# Patient Record
Sex: MALE | Race: WHITE | NOT HISPANIC OR LATINO | URBAN - METROPOLITAN AREA
[De-identification: names, ages, dates, MRNs, and addresses within clinical notes are randomized per-mention and may not be internally consistent; named-entity substitution may affect disease eponyms.]

---

## 2017-09-06 ENCOUNTER — OUTPATIENT (OUTPATIENT)
Dept: OUTPATIENT SERVICES | Facility: HOSPITAL | Age: 57
LOS: 1 days | Discharge: HOME | End: 2017-09-06

## 2017-09-06 DIAGNOSIS — Z00.00 ENCOUNTER FOR GENERAL ADULT MEDICAL EXAMINATION WITHOUT ABNORMAL FINDINGS: ICD-10-CM

## 2017-11-08 ENCOUNTER — APPOINTMENT (OUTPATIENT)
Dept: OTOLARYNGOLOGY | Facility: CLINIC | Age: 57
End: 2017-11-08
Payer: COMMERCIAL

## 2017-11-08 DIAGNOSIS — Z87.09 PERSONAL HISTORY OF OTHER DISEASES OF THE RESPIRATORY SYSTEM: ICD-10-CM

## 2017-11-08 DIAGNOSIS — R04.0 EPISTAXIS: ICD-10-CM

## 2017-11-08 DIAGNOSIS — H61.20 IMPACTED CERUMEN, UNSPECIFIED EAR: ICD-10-CM

## 2017-11-08 PROCEDURE — 99214 OFFICE O/P EST MOD 30 MIN: CPT | Mod: 25

## 2017-11-08 PROCEDURE — 31575 DIAGNOSTIC LARYNGOSCOPY: CPT

## 2017-11-08 PROCEDURE — 69210 REMOVE IMPACTED EAR WAX UNI: CPT

## 2017-12-04 ENCOUNTER — APPOINTMENT (OUTPATIENT)
Dept: OTOLARYNGOLOGY | Facility: CLINIC | Age: 57
End: 2017-12-04
Payer: COMMERCIAL

## 2017-12-04 DIAGNOSIS — K14.8 OTHER DISEASES OF TONGUE: ICD-10-CM

## 2017-12-04 DIAGNOSIS — Z78.9 OTHER SPECIFIED HEALTH STATUS: ICD-10-CM

## 2017-12-04 PROCEDURE — 99213 OFFICE O/P EST LOW 20 MIN: CPT

## 2018-03-27 ENCOUNTER — OUTPATIENT (OUTPATIENT)
Dept: OUTPATIENT SERVICES | Facility: HOSPITAL | Age: 58
LOS: 1 days | Discharge: HOME | End: 2018-03-27

## 2018-03-27 DIAGNOSIS — M62.830 MUSCLE SPASM OF BACK: ICD-10-CM

## 2018-09-28 ENCOUNTER — OUTPATIENT (OUTPATIENT)
Dept: OUTPATIENT SERVICES | Facility: HOSPITAL | Age: 58
LOS: 1 days | Discharge: HOME | End: 2018-09-28

## 2018-09-28 DIAGNOSIS — Z00.00 ENCOUNTER FOR GENERAL ADULT MEDICAL EXAMINATION WITHOUT ABNORMAL FINDINGS: ICD-10-CM

## 2018-12-19 ENCOUNTER — OUTPATIENT (OUTPATIENT)
Dept: OUTPATIENT SERVICES | Facility: HOSPITAL | Age: 58
LOS: 1 days | Discharge: HOME | End: 2018-12-19

## 2018-12-19 DIAGNOSIS — M25.531 PAIN IN RIGHT WRIST: ICD-10-CM

## 2019-07-10 ENCOUNTER — APPOINTMENT (OUTPATIENT)
Dept: CARDIOLOGY | Facility: CLINIC | Age: 59
End: 2019-07-10
Payer: COMMERCIAL

## 2019-07-10 PROCEDURE — 99213 OFFICE O/P EST LOW 20 MIN: CPT

## 2019-07-10 PROCEDURE — 93000 ELECTROCARDIOGRAM COMPLETE: CPT

## 2019-09-24 ENCOUNTER — OUTPATIENT (OUTPATIENT)
Dept: OUTPATIENT SERVICES | Facility: HOSPITAL | Age: 59
LOS: 1 days | Discharge: HOME | End: 2019-09-24
Payer: COMMERCIAL

## 2019-09-24 DIAGNOSIS — M54.9 DORSALGIA, UNSPECIFIED: ICD-10-CM

## 2019-09-24 PROCEDURE — 72146 MRI CHEST SPINE W/O DYE: CPT | Mod: 26

## 2019-09-25 ENCOUNTER — APPOINTMENT (OUTPATIENT)
Dept: CARDIOLOGY | Facility: CLINIC | Age: 59
End: 2019-09-25

## 2019-09-26 ENCOUNTER — OUTPATIENT (OUTPATIENT)
Dept: OUTPATIENT SERVICES | Facility: HOSPITAL | Age: 59
LOS: 1 days | Discharge: HOME | End: 2019-09-26

## 2019-09-26 DIAGNOSIS — N39.0 URINARY TRACT INFECTION, SITE NOT SPECIFIED: ICD-10-CM

## 2019-09-26 DIAGNOSIS — Z00.00 ENCOUNTER FOR GENERAL ADULT MEDICAL EXAMINATION WITHOUT ABNORMAL FINDINGS: ICD-10-CM

## 2019-09-26 DIAGNOSIS — E03.9 HYPOTHYROIDISM, UNSPECIFIED: ICD-10-CM

## 2019-09-26 DIAGNOSIS — E78.9 DISORDER OF LIPOPROTEIN METABOLISM, UNSPECIFIED: ICD-10-CM

## 2019-09-26 DIAGNOSIS — D64.9 ANEMIA, UNSPECIFIED: ICD-10-CM

## 2019-09-26 DIAGNOSIS — E11.9 TYPE 2 DIABETES MELLITUS WITHOUT COMPLICATIONS: ICD-10-CM

## 2019-09-26 DIAGNOSIS — N40.0 BENIGN PROSTATIC HYPERPLASIA WITHOUT LOWER URINARY TRACT SYMPTOMS: ICD-10-CM

## 2019-10-01 ENCOUNTER — OUTPATIENT (OUTPATIENT)
Dept: OUTPATIENT SERVICES | Facility: HOSPITAL | Age: 59
LOS: 1 days | Discharge: HOME | End: 2019-10-01
Payer: COMMERCIAL

## 2019-10-01 DIAGNOSIS — Z00.00 ENCOUNTER FOR GENERAL ADULT MEDICAL EXAMINATION WITHOUT ABNORMAL FINDINGS: ICD-10-CM

## 2019-10-01 PROCEDURE — 71046 X-RAY EXAM CHEST 2 VIEWS: CPT | Mod: 26

## 2019-10-01 PROCEDURE — 76775 US EXAM ABDO BACK WALL LIM: CPT | Mod: 26

## 2019-12-09 ENCOUNTER — INPATIENT (INPATIENT)
Facility: HOSPITAL | Age: 59
LOS: 0 days | Discharge: HOME | End: 2019-12-10
Attending: INTERNAL MEDICINE | Admitting: INTERNAL MEDICINE
Payer: COMMERCIAL

## 2019-12-09 ENCOUNTER — APPOINTMENT (OUTPATIENT)
Dept: CARDIOLOGY | Facility: CLINIC | Age: 59
End: 2019-12-09
Payer: COMMERCIAL

## 2019-12-09 VITALS
SYSTOLIC BLOOD PRESSURE: 143 MMHG | TEMPERATURE: 99 F | OXYGEN SATURATION: 96 % | DIASTOLIC BLOOD PRESSURE: 71 MMHG | RESPIRATION RATE: 18 BRPM | HEART RATE: 65 BPM

## 2019-12-09 DIAGNOSIS — Z98.890 OTHER SPECIFIED POSTPROCEDURAL STATES: Chronic | ICD-10-CM

## 2019-12-09 LAB
ALBUMIN SERPL ELPH-MCNC: 4.2 G/DL — SIGNIFICANT CHANGE UP (ref 3.5–5.2)
ALP SERPL-CCNC: 57 U/L — SIGNIFICANT CHANGE UP (ref 30–115)
ALT FLD-CCNC: 22 U/L — SIGNIFICANT CHANGE UP (ref 0–41)
ANION GAP SERPL CALC-SCNC: 16 MMOL/L — HIGH (ref 7–14)
AST SERPL-CCNC: 23 U/L — SIGNIFICANT CHANGE UP (ref 0–41)
BASOPHILS # BLD AUTO: 0.02 K/UL — SIGNIFICANT CHANGE UP (ref 0–0.2)
BASOPHILS NFR BLD AUTO: 0.3 % — SIGNIFICANT CHANGE UP (ref 0–1)
BILIRUB SERPL-MCNC: 0.9 MG/DL — SIGNIFICANT CHANGE UP (ref 0.2–1.2)
BUN SERPL-MCNC: 16 MG/DL — SIGNIFICANT CHANGE UP (ref 10–20)
CALCIUM SERPL-MCNC: 9.2 MG/DL — SIGNIFICANT CHANGE UP (ref 8.5–10.1)
CHLORIDE SERPL-SCNC: 101 MMOL/L — SIGNIFICANT CHANGE UP (ref 98–110)
CO2 SERPL-SCNC: 21 MMOL/L — SIGNIFICANT CHANGE UP (ref 17–32)
CREAT SERPL-MCNC: 0.9 MG/DL — SIGNIFICANT CHANGE UP (ref 0.7–1.5)
EOSINOPHIL # BLD AUTO: 0.14 K/UL — SIGNIFICANT CHANGE UP (ref 0–0.7)
EOSINOPHIL NFR BLD AUTO: 1.9 % — SIGNIFICANT CHANGE UP (ref 0–8)
GLUCOSE SERPL-MCNC: 101 MG/DL — HIGH (ref 70–99)
HCT VFR BLD CALC: 43.8 % — SIGNIFICANT CHANGE UP (ref 42–52)
HCT VFR BLD CALC: 44.8 % — SIGNIFICANT CHANGE UP (ref 42–52)
HGB BLD-MCNC: 14.7 G/DL — SIGNIFICANT CHANGE UP (ref 14–18)
HGB BLD-MCNC: 15.5 G/DL — SIGNIFICANT CHANGE UP (ref 14–18)
IMM GRANULOCYTES NFR BLD AUTO: 0.3 % — SIGNIFICANT CHANGE UP (ref 0.1–0.3)
LYMPHOCYTES # BLD AUTO: 1.12 K/UL — LOW (ref 1.2–3.4)
LYMPHOCYTES # BLD AUTO: 15 % — LOW (ref 20.5–51.1)
MAGNESIUM SERPL-MCNC: 2.2 MG/DL — SIGNIFICANT CHANGE UP (ref 1.8–2.4)
MCHC RBC-ENTMCNC: 29.4 PG — SIGNIFICANT CHANGE UP (ref 27–31)
MCHC RBC-ENTMCNC: 29.9 PG — SIGNIFICANT CHANGE UP (ref 27–31)
MCHC RBC-ENTMCNC: 33.6 G/DL — SIGNIFICANT CHANGE UP (ref 32–37)
MCHC RBC-ENTMCNC: 34.6 G/DL — SIGNIFICANT CHANGE UP (ref 32–37)
MCV RBC AUTO: 86.3 FL — SIGNIFICANT CHANGE UP (ref 80–94)
MCV RBC AUTO: 87.6 FL — SIGNIFICANT CHANGE UP (ref 80–94)
MONOCYTES # BLD AUTO: 0.95 K/UL — HIGH (ref 0.1–0.6)
MONOCYTES NFR BLD AUTO: 12.8 % — HIGH (ref 1.7–9.3)
NEUTROPHILS # BLD AUTO: 5.2 K/UL — SIGNIFICANT CHANGE UP (ref 1.4–6.5)
NEUTROPHILS NFR BLD AUTO: 69.7 % — SIGNIFICANT CHANGE UP (ref 42.2–75.2)
NRBC # BLD: 0 /100 WBCS — SIGNIFICANT CHANGE UP (ref 0–0)
NRBC # BLD: 0 /100 WBCS — SIGNIFICANT CHANGE UP (ref 0–0)
PLATELET # BLD AUTO: 203 K/UL — SIGNIFICANT CHANGE UP (ref 130–400)
PLATELET # BLD AUTO: 219 K/UL — SIGNIFICANT CHANGE UP (ref 130–400)
POTASSIUM SERPL-MCNC: 4.2 MMOL/L — SIGNIFICANT CHANGE UP (ref 3.5–5)
POTASSIUM SERPL-SCNC: 4.2 MMOL/L — SIGNIFICANT CHANGE UP (ref 3.5–5)
PROT SERPL-MCNC: 6.9 G/DL — SIGNIFICANT CHANGE UP (ref 6–8)
RBC # BLD: 5 M/UL — SIGNIFICANT CHANGE UP (ref 4.7–6.1)
RBC # BLD: 5.19 M/UL — SIGNIFICANT CHANGE UP (ref 4.7–6.1)
RBC # FLD: 12.1 % — SIGNIFICANT CHANGE UP (ref 11.5–14.5)
RBC # FLD: 12.3 % — SIGNIFICANT CHANGE UP (ref 11.5–14.5)
SODIUM SERPL-SCNC: 138 MMOL/L — SIGNIFICANT CHANGE UP (ref 135–146)
TROPONIN T SERPL-MCNC: 0.05 NG/ML — CRITICAL HIGH
TROPONIN T SERPL-MCNC: 0.05 NG/ML — CRITICAL HIGH
WBC # BLD: 4.75 K/UL — LOW (ref 4.8–10.8)
WBC # BLD: 7.45 K/UL — SIGNIFICANT CHANGE UP (ref 4.8–10.8)
WBC # FLD AUTO: 4.75 K/UL — LOW (ref 4.8–10.8)
WBC # FLD AUTO: 7.45 K/UL — SIGNIFICANT CHANGE UP (ref 4.8–10.8)

## 2019-12-09 PROCEDURE — 93458 L HRT ARTERY/VENTRICLE ANGIO: CPT | Mod: 26,XU

## 2019-12-09 PROCEDURE — 93010 ELECTROCARDIOGRAM REPORT: CPT | Mod: 59

## 2019-12-09 PROCEDURE — 92928 PRQ TCAT PLMT NTRAC ST 1 LES: CPT | Mod: RC

## 2019-12-09 PROCEDURE — 99285 EMERGENCY DEPT VISIT HI MDM: CPT

## 2019-12-09 PROCEDURE — 71046 X-RAY EXAM CHEST 2 VIEWS: CPT | Mod: 26

## 2019-12-09 PROCEDURE — 93000 ELECTROCARDIOGRAM COMPLETE: CPT | Mod: 77

## 2019-12-09 PROCEDURE — 93010 ELECTROCARDIOGRAM REPORT: CPT | Mod: 77

## 2019-12-09 PROCEDURE — 99214 OFFICE O/P EST MOD 30 MIN: CPT

## 2019-12-09 PROCEDURE — 99223 1ST HOSP IP/OBS HIGH 75: CPT

## 2019-12-09 RX ORDER — TRIHEXYPHENIDYL HCL 2 MG
1 TABLET ORAL DAILY
Refills: 0 | Status: DISCONTINUED | OUTPATIENT
Start: 2019-12-09 | End: 2019-12-10

## 2019-12-09 RX ORDER — ASPIRIN/CALCIUM CARB/MAGNESIUM 324 MG
81 TABLET ORAL DAILY
Refills: 0 | Status: DISCONTINUED | OUTPATIENT
Start: 2019-12-09 | End: 2019-12-10

## 2019-12-09 RX ORDER — CLOPIDOGREL BISULFATE 75 MG/1
75 TABLET, FILM COATED ORAL DAILY
Refills: 0 | Status: DISCONTINUED | OUTPATIENT
Start: 2019-12-10 | End: 2019-12-10

## 2019-12-09 RX ORDER — CLONAZEPAM 1 MG
0.5 TABLET ORAL
Refills: 0 | Status: DISCONTINUED | OUTPATIENT
Start: 2019-12-09 | End: 2019-12-10

## 2019-12-09 RX ORDER — CLONAZEPAM 1 MG
0.5 TABLET ORAL
Qty: 0 | Refills: 0 | DISCHARGE

## 2019-12-09 RX ORDER — SODIUM CHLORIDE 9 MG/ML
1000 INJECTION INTRAMUSCULAR; INTRAVENOUS; SUBCUTANEOUS
Refills: 0 | Status: DISCONTINUED | OUTPATIENT
Start: 2019-12-09 | End: 2019-12-10

## 2019-12-09 RX ORDER — ENOXAPARIN SODIUM 100 MG/ML
40 INJECTION SUBCUTANEOUS AT BEDTIME
Refills: 0 | Status: DISCONTINUED | OUTPATIENT
Start: 2019-12-09 | End: 2019-12-10

## 2019-12-09 RX ORDER — TRIHEXYPHENIDYL HCL 2 MG
1 TABLET ORAL
Qty: 0 | Refills: 0 | DISCHARGE

## 2019-12-09 RX ORDER — ATORVASTATIN CALCIUM 80 MG/1
80 TABLET, FILM COATED ORAL AT BEDTIME
Refills: 0 | Status: DISCONTINUED | OUTPATIENT
Start: 2019-12-09 | End: 2019-12-10

## 2019-12-09 RX ORDER — CHLORHEXIDINE GLUCONATE 213 G/1000ML
1 SOLUTION TOPICAL
Refills: 0 | Status: DISCONTINUED | OUTPATIENT
Start: 2019-12-09 | End: 2019-12-10

## 2019-12-09 RX ORDER — AMLODIPINE BESYLATE 2.5 MG/1
0 TABLET ORAL
Qty: 0 | Refills: 0 | DISCHARGE

## 2019-12-09 RX ORDER — AMLODIPINE BESYLATE 2.5 MG/1
5 TABLET ORAL DAILY
Refills: 0 | Status: DISCONTINUED | OUTPATIENT
Start: 2019-12-09 | End: 2019-12-10

## 2019-12-09 RX ORDER — ASPIRIN/CALCIUM CARB/MAGNESIUM 324 MG
325 TABLET ORAL ONCE
Refills: 0 | Status: COMPLETED | OUTPATIENT
Start: 2019-12-09 | End: 2019-12-09

## 2019-12-09 RX ADMIN — ATORVASTATIN CALCIUM 80 MILLIGRAM(S): 80 TABLET, FILM COATED ORAL at 21:57

## 2019-12-09 RX ADMIN — Medication 0.5 MILLIGRAM(S): at 21:57

## 2019-12-09 RX ADMIN — ENOXAPARIN SODIUM 40 MILLIGRAM(S): 100 INJECTION SUBCUTANEOUS at 21:57

## 2019-12-09 NOTE — H&P ADULT - ATTENDING COMMENTS
HPI as above.  Interval history: Pt seen and examined at bedside. No cp or sob.   Vital Signs (24 Hrs):  T(C): 37.2 (12-09-19 @ 12:06), Max: 37.2 (12-09-19 @ 12:06)  HR: 65 (12-09-19 @ 12:06) (65 - 65)  BP: 143/71 (12-09-19 @ 12:06) (143/71 - 143/71)  RR: 18 (12-09-19 @ 12:06) (18 - 18)  SpO2: 96% (12-09-19 @ 12:06) (96% - 96%)  Wt(kg): --  Daily     Daily     I&O's Summary    PHYSICAL EXAM:  GENERAL: NAD, well-developed  HEAD:  Atraumatic, Normocephalic  EYES: EOMI, PERRLA, conjunctiva and sclera clear  NECK: Supple, No JVD  CHEST/LUNG: Clear to auscultation bilaterally; No wheeze  HEART: Regular rate and rhythm; No murmurs, rubs, or gallops  ABDOMEN: Soft, Nontender, Nondistended; Bowel sounds present  EXTREMITIES:  2+ Peripheral Pulses, No clubbing, cyanosis, or edema  PSYCH: AAOx3  NEUROLOGY: non-focal  SKIN: No rashes or lesions    Labs reviewed  Imaging reviewed: < from: Xray Chest 2 Views PA/Lat (12.09.19 @ 13:18) >    Impression:      No radiographic evidence of acute cardiopulmonary disease.    < end of copied text >      EKG reviewed: < from: 12 Lead ECG (12.09.19 @ 12:08) >    Diagnosis Line Normal sinus rhythm  Rightward axis  Borderline ECG    Plan  #Unstable angina s/p Cath  -RADHA x1 pRCA   -Continue asa, plavix, no Beta-Blocker therapy (pt HR 50-60s)  -Restart Acei in am   -Pt on lipitor tonight, pt has myalgias on lipitor, DW cardiology recc crestor 20 mg Po daily upon DC  -Admit to cardiac telemonitoring  -follow up cardiology outpt   -Echo in am    #depression- continue celebrex     #HTN- continue amlodipine, acei in am.     #Progress Note Handoff  Pending (specify):  Consults_________, Tests________, Test Results_______, Other_________  Family discussion:  Disposition: Home___/SNF___/Other________/Unknown at this time________ HPI as above.  Interval history: Pt seen and examined at bedside. No cp or sob.   Vital Signs (24 Hrs):  T(C): 37.2 (12-09-19 @ 12:06), Max: 37.2 (12-09-19 @ 12:06)  HR: 65 (12-09-19 @ 12:06) (65 - 65)  BP: 143/71 (12-09-19 @ 12:06) (143/71 - 143/71)  RR: 18 (12-09-19 @ 12:06) (18 - 18)  SpO2: 96% (12-09-19 @ 12:06) (96% - 96%)  Wt(kg): --  Daily     Daily     I&O's Summary    PHYSICAL EXAM:  GENERAL: NAD, well-developed  HEAD:  Atraumatic, Normocephalic  EYES: EOMI, PERRLA, conjunctiva and sclera clear  NECK: Supple, No JVD  CHEST/LUNG: Clear to auscultation bilaterally; No wheeze  HEART: Regular rate and rhythm; No murmurs, rubs, or gallops  ABDOMEN: Soft, Nontender, Nondistended; Bowel sounds present  EXTREMITIES:  2+ Peripheral Pulses, No clubbing, cyanosis, or edema  PSYCH: AAOx3  NEUROLOGY: non-focal  SKIN: No rashes or lesions    Labs reviewed  Imaging reviewed: < from: Xray Chest 2 Views PA/Lat (12.09.19 @ 13:18) >    Impression:      No radiographic evidence of acute cardiopulmonary disease.    < end of copied text >      EKG reviewed: < from: 12 Lead ECG (12.09.19 @ 12:08) >    Diagnosis Line Normal sinus rhythm  Rightward axis  Borderline ECG    Plan  #Unstable angina s/p Cath  -RADHA x1 pRCA   -Continue asa, plavix, no Beta-Blocker therapy (pt HR 50-60s)  -Restart Acei in am   -Pt on lipitor tonight, pt has myalgias on lipitor, DW cardiology recc crestor 20 mg Po daily upon DC  -Admit to cardiac telemonitoring  -follow up cardiology outpt   -Echo in am    #depression- continue celebrex     #HTN- continue amlodipine, acei in am.     #Progress Note Handoff  Pending (specify):  Consults___Cardio______, Tests________, Test Results_______, Other_________  Family discussion: debbi pt and agreed to plan to be admitted to cardiac telemonitoring for overnight monitoring psot cath  Disposition: Home_x__/SNF___/Other________/Unknown at this time________ HPI as above.  Interval history: Pt seen and examined at bedside. No cp or sob.   Vital Signs (24 Hrs):  T(C): 37.2 (12-09-19 @ 12:06), Max: 37.2 (12-09-19 @ 12:06)  HR: 65 (12-09-19 @ 12:06) (65 - 65)  BP: 143/71 (12-09-19 @ 12:06) (143/71 - 143/71)  RR: 18 (12-09-19 @ 12:06) (18 - 18)  SpO2: 96% (12-09-19 @ 12:06) (96% - 96%)  Wt(kg): --  Daily     Daily     I&O's Summary    PHYSICAL EXAM:  GENERAL: NAD, well-developed  HEAD:  Atraumatic, Normocephalic  EYES: EOMI, PERRLA, conjunctiva and sclera clear  NECK: Supple, No JVD  CHEST/LUNG: Clear to auscultation bilaterally; No wheeze  HEART: Regular rate and rhythm; No murmurs, rubs, or gallops  ABDOMEN: Soft, Nontender, Nondistended; Bowel sounds present  EXTREMITIES:  2+ Peripheral Pulses, No clubbing, cyanosis, or edema  PSYCH: AAOx3  NEUROLOGY: non-focal  SKIN: No rashes or lesions    Labs reviewed  Imaging reviewed: < from: Xray Chest 2 Views PA/Lat (12.09.19 @ 13:18) >    Impression:      No radiographic evidence of acute cardiopulmonary disease.    < end of copied text >      EKG reviewed: < from: 12 Lead ECG (12.09.19 @ 12:08) >    Diagnosis Line Normal sinus rhythm  Rightward axis  Borderline ECG    Plan  #Unstable angina s/p Cath  -RADHA x1 pRCA   -Continue asa, plavix, no Beta-Blocker therapy (pt HR 50-60s)  -Restart Acei in am   -Pt on lipitor tonight, pt has myalgias on lipitor, DW cardiology recc crestor 20 mg Po daily upon DC  -Admit to cardiac telemonitoring  -follow up cardiology outpt   -Echo in am    #chronic back pain-dc celebrex     #HTN- continue amlodipine, acei in am.     #Progress Note Handoff  Pending (specify):  Consults___Cardio______, Tests________, Test Results_______, Other_________  Family discussion: dw pt and agreed to plan to be admitted to cardiac telemonitoring for overnight monitoring psot cath  Disposition: Home_x__/SNF___/Other________/Unknown at this time________

## 2019-12-09 NOTE — CONSULT NOTE ADULT - SUBJECTIVE AND OBJECTIVE BOX
Date of Admission: 12/9/2019    CHIEF COMPLAINT: chest pain    HISTORY OF PRESENT ILLNESS: 59yMale with PMH below presented to the hospital for one week of on and off chest pain, sometimes with exertion, sometimes at rest with radiation to the back of the neck and sometimes into bilateral upper extremities. He went to Dr. Solis's office this morning and symptoms and EKG were concerning for acute coronary syndrome, which prompted a visit to the emergency room. At present is pain free. Of note, had a negative stress echo three years ago and has a history of mild prolapse of the anterior leaflet of the mitral valve. Strong family history of cardiovascular disease.     PAST MEDICAL & SURGICAL HISTORY:  Gastric ulcer  HTN (hypertension)  H/O hernia repair      FAMILY HISTORY:  [ ] no pertinent family history of premature cardiovascular disease in first degree relatives.  Mother:   Father: sudden cardiac death in his 50s  Siblings:     SOCIAL HISTORY:    [x ] Non-smoker  [ ] Smoker  [ ] Alcohol    Allergies    penicillin (Unknown)  sulfa drugs (Unknown)    Intolerances    	    REVIEW OF SYSTEMS:  CONSTITUTIONAL: No fever, weight loss, or fatigue  CARDIOLOGY: see HPI  RESPIRATORY: denies shortness of breath, wheezeing.   NEUROLOGICAL: NO weakness, no focal deficits to report.  ENDOCRINOLOGICAL: no recent change in diabetic medications.   GI: no BRBPR, no N,V,diarrhea.    PSYCHIATRY: normal mood and affect  HEENT: no nasal discharge, no ecchymosis  SKIN: no ecchymosis, no breakdown  MUSCULOSKELETAL: Full range of motion x4.     PHYSICAL EXAM:  T(C): 37.2 (12-09-19 @ 12:06), Max: 37.2 (12-09-19 @ 12:06)  HR: 65 (12-09-19 @ 12:06) (65 - 65)  BP: 143/71 (12-09-19 @ 12:06) (143/71 - 143/71)  RR: 18 (12-09-19 @ 12:06) (18 - 18)  SpO2: 96% (12-09-19 @ 12:06) (96% - 96%)  Wt(kg): --  I&O's Summary      General Appearance: well appearing, normal for age and gender. 	  Neck: normal JVP, no bruit.   Eyes: No xanthomalasia, Extra Ocular muscles intact.   Cardiovascular: regular rate and rhythm S1 S2, No JVD, No murmurs, No edema  Respiratory: Lungs clear to auscultation	  Psychiatry: Alert and oriented x 3, Mood & affect appropriate  Gastrointestinal:  Soft, Non-tender  Skin/Integumen: No rashes, No ecchymoses, No cyanosis	  Neurologic: Non-focal  Musculoskeletal/ extremities: Normal range of motion, No clubbing, cyanosis or edema  Vascular: Peripheral pulses palpable 2+ bilaterally    LABS:	 	                          15.5   4.75  )-----------( 203      ( 09 Dec 2019 12:50 )             44.8     12-09    138  |  101  |  16  ----------------------------<  101<H>  4.2   |  21  |  0.9    Ca    9.2      09 Dec 2019 12:50  Mg     2.2     12-09    TPro  6.9  /  Alb  4.2  /  TBili  0.9  /  DBili  x   /  AST  23  /  ALT  22  /  AlkPhos  57  12-09    CARDIAC MARKERS ( 09 Dec 2019 12:50 )  x     / 0.05 ng/mL / x     / x     / x              CARDIAC MARKERS:            TELEMETRY EVENTS: 	    ECG:  	NSR with TWI in inferior leads  RADIOLOGY:  OTHER: 	    PREVIOUS DIAGNOSTIC TESTING:    [ ] Echocardiogram:  [ ]  Catheterization:  [ ] Stress Test:  	2016 stress echo, WNL  	    Home Medications:  amLODIPine:  (09 Dec 2019 13:37)    MEDICATIONS  (STANDING):    MEDICATIONS  (PRN):

## 2019-12-09 NOTE — ED ADULT NURSE NOTE - NSIMPLEMENTINTERV_GEN_ALL_ED
Implemented All Universal Safety Interventions:  [place] to call system. Call bell, personal items and telephone within reach. Instruct patient to call for assistance. Room bathroom lighting operational. Non-slip footwear when patient is off stretcher. Physically safe environment: no spills, clutter or unnecessary equipment. Stretcher in lowest position, wheels locked, appropriate side rails in place.

## 2019-12-09 NOTE — ED PROVIDER NOTE - NS ED ROS FT
Constitutional:  See HPI.  Eyes:  No visual changes, eye pain or discharge.  ENMT:  No hearing changes, pain, discharge or infections. No neck pain or stiffness.  Cardiac: see hpi  Respiratory:  No cough or respiratory distress. No hemoptysis. No history of asthma or RAD.  GI:  No nausea, vomiting, diarrhea or abdominal pain.  :  No dysuria, frequency or burning.  MS:  No myalgia, muscle weakness, joint pain or back pain.  Neuro:  No headache or weakness.  No LOC.  Skin:  No skin rash.   Endocrine: No history of thyroid disease or diabetes.  Except as documented in the HPI,  all other systems are negative.

## 2019-12-09 NOTE — ED ADULT NURSE NOTE - IN THE PAST 12 MONTHS HAVE YOU USED DRUGS OTHER THAN THOSE REQUIRED FOR MEDICAL REASON?
Problem: Fall Injury Risk  Goal: Absence of Fall and Fall-Related Injury    Intervention: Identify and Manage Contributors to Fall Injury Risk   01/02/19 2000   Manage Acute Allergic Reaction   Medication Review/Management medications reviewed   Identify and Manage Contributors to Fall Injury Risk   Self-Care Promotion independence encouraged;BADL personal objects within reach;BADL personal routines maintained     Intervention: Promote Injury-Free Environment   01/02/19 2000 01/03/19 1400   Optimize [place] and Functional Mobility   Environmental Safety Modification clutter free environment maintained;lighting adjusted;room near unit station --    Optimize Balance and Safe Activity   Safety Promotion/Fall Prevention --  bed alarm set;Fall Risk reviewed with patient/family;instructed to call staff for mobility            No

## 2019-12-09 NOTE — H&P ADULT - NSHPSOCIALHISTORY_GEN_ALL_CORE
Patient denies smoking history; endorses drinking 1 beer/week; denies illicit drug use including cocaine, amphetamines, marijuana, opiates. Lives at home w/ wife; fully functional.

## 2019-12-09 NOTE — ED PROVIDER NOTE - CLINICAL SUMMARY MEDICAL DECISION MAKING FREE TEXT BOX
60 Y/O H HTN, C/OP CP X 5 DAYS. + EKG CHANGES. TROPONIN NORMAL. CXR NORMAL. CARDIOLOGY CONSULTED, FOR CATH LATER TODAY. PT ADMITTED TO TELEMETRY.

## 2019-12-09 NOTE — ED ADULT NURSE NOTE - PRIVACY CONCERNS
Discharge phone call completed with patient on 2/14/2019 @ 1130.      Questions and concerns were verbalized:  No issues reported at this time.    Patient is aware of signs and symptoms necessitating a call to his  doctor. Patient did not receive any prescriptions during this ED visit.     Patient confirmed that he is aware of scheduling a follow-up appointment.     Lazarus Gutiérrez RN  2/14/2019     No

## 2019-12-09 NOTE — H&P ADULT - ASSESSMENT
The patient is a 59y male with a PMHx of mild mitral valve prolapse, +FH CAD, gastric ulcer, HTN, never smoker, chronic back pain, focal dystonia presented to [facility] from Cardiologist Dr. Solis's office after 5 days of intermittent chest pain, admitted for unstable angina, s/p cardiac cath today 12/9 s/p RADHA in RCA.    # Unstable angina s/p cardiac cath 12/9 s/p RADHA in RCA, resolved  Begin Lipitor 80 mg PO qHS tonight  C/w amlodipine 5 mg PO qHS (can switch to Crestor tomorrow)  C/w Plavix 75 mg PO qD, aspirin 81 mg PO qD  F/u 8 PM, AM trops  F/u 2D echo  F/u w/ Cardio  Admit to Telemetry    # HTN  C/w amlodipine 5 mg PO qHS (can switch to Crestor tomorrow)  Vitals per routine    # Chronic back pain  F/u w/ PMD; Celebrex d/c'd due to bleeding risk in conjunction w/ other medications    # Anxiety  C/w Klonopin 0.5 mg PO BID    # CHG  # Dispo- acute; pending echo; admit to tele for monitoring s/p cardiac cath  # DVT ppx- Lovenox 40 mg PO qHS  # GI ppx- not indicated  # Code status  # The patient is a 59y male with a PMHx of mild mitral valve prolapse, +FH CAD, gastric ulcer, HTN, never smoker, chronic back pain, focal dystonia presented to [facility] from Cardiologist Dr. Solis's office after 5 days of intermittent chest pain, admitted for unstable angina, s/p cardiac cath today 12/9 s/p RADHA in RCA.    # Unstable angina s/p cardiac cath 12/9 s/p RADHA in RCA, resolved  Begin Lipitor 80 mg PO qHS tonight  C/w amlodipine 5 mg PO qHS (can switch to Crestor tomorrow)  C/w Plavix 75 mg PO qD, aspirin 81 mg PO qD  F/u 8 PM, AM trops  F/u 2D echo  F/u w/ Cardio  Admit to Telemetry    # HTN  C/w amlodipine 5 mg PO qHS (can switch to Crestor tomorrow)  Vitals per routine    # Chronic back pain  F/u w/ PMD; Celebrex d/c'd due to bleeding risk in conjunction w/ other medications    # Anxiety  C/w Klonopin 0.5 mg PO BID    # CHG  # Dispo- acute; pending echo; admit to tele for monitoring s/p cardiac cath  # DVT ppx- Lovenox 40 mg PO qHS  # GI ppx- not indicated  # Code status  # Activity- bedrest  # Diet- DASH

## 2019-12-09 NOTE — H&P ADULT - NSHPLABSRESULTS_GEN_ALL_CORE
15.5   4.75  )-----------( 203      ( 09 Dec 2019 12:50 )             44.8     12-09    138  |  101  |  16  ----------------------------<  101<H>  4.2   |  21  |  0.9    Ca    9.2      09 Dec 2019 12:50  Mg     2.2     12-09    TPro  6.9  /  Alb  4.2  /  TBili  0.9  /  DBili  x   /  AST  23  /  ALT  22  /  AlkPhos  57  12-09    Troponin T, Serum (12.09.19 @ 12:50)    Troponin T, Serum: 0.05: Critical value:  TYPE:(C=Critical, N=Notification, A=Abnormal) C  TESTS: _TROP  DATE/TIME CALLED: _12/09/19 13:30  CALLED TO: _DR.VAN PARKINSON  READ BACK (2 Patient Identifiers)(Y/N): _Y  READ BACK VALUES (Y/N): _Y  CALLED BY: _BMR ng/mL    < from: 12 Lead ECG (12.09.19 @ 12:08) >      Ventricular Rate 67 BPM    Atrial Rate 67 BPM    P-R Interval 150 ms    QRS Duration 90 ms    Q-T Interval 394 ms    QTC Calculation(Bezet) 416 ms    P Axis 49 degrees    R Axis 102 degrees    T Axis 2 degrees    Diagnosis Line Normal sinus rhythm  Rightward axis  Borderline ECG    Confirmed by VINCENT AUSTIN MD ([id number]) on 12/9/2019 4:16:51 PM

## 2019-12-09 NOTE — CHART NOTE - NSCHARTNOTEFT_GEN_A_CORE
PRE-OP DIAGNOSIS: unstable angina    PROCEDURE: [facility] with coronary angiography    Physician: Ziggy  Assistant: Don    ANESTHESIA TYPE:  [  ]General Anesthesia  [ x ] Sedation  [ x ] Local/Regional    ESTIMATED BLOOD LOSS:    10   mL    CONDITION  [  ] Critical  [  ] Serious  [  ]Fair  [x  ]Good      SPECIMENS REMOVED (IF APPLICABLE): N/A      IV CONTRAST:      150       mL      IMPLANTS (IF APPLICABLE)      FINDINGS    Left Heart Catheterization:  LVEF%:  LVEDP: normal  [ ] Normal Coronary Arteries  [ ] Luminal Irregularities  [ ] Non-obstructive CAD      LEFT HEART CATHETERIZATION                                    Left main: normal    LAD: prox mild disease  Diag:    Left Circumflex: minor luminal irregularities  OM:    Right Coronary Artery: prox 99% lesion.   RPDA  RPL    Ramus Intermed:    DOMINANCE: Right    ACCESS: r radial  CLOSURE: d stat    INTERVENTION  IMPLANTS: RADHA x1 in pRCA    POST-OP DIAGNOSIS: unstable angina, PCI to RCA          PLAN OF CARE  [ ] D/C Home today  [ ]  D/C in AM  [x ] Return to In-patient bed  [ ] Admit for observation  [ ] Return for staged procedure:  [ ] CT Surgery consult called  [x ]  Continue DAPT, B-blocker & Statin therapy PRE-OP DIAGNOSIS: unstable angina    PROCEDURE: [facility] with coronary angiography    Physician: Ziggy  Assistant: Don    ANESTHESIA TYPE:  [  ]General Anesthesia  [ x ] Sedation  [ x ] Local/Regional    ESTIMATED BLOOD LOSS:    10   mL    CONDITION  [  ] Critical  [  ] Serious  [  ]Fair  [x  ]Good      SPECIMENS REMOVED (IF APPLICABLE): N/A      IV CONTRAST:      150       mL      IMPLANTS (IF APPLICABLE)      FINDINGS    Left Heart Catheterization:  LVEF%:  LVEDP: normal  [ ] Normal Coronary Arteries  [ ] Luminal Irregularities  [ ] Non-obstructive CAD      LEFT HEART CATHETERIZATION                                    Left main: normal    LAD: prox mild disease  Diag:    Left Circumflex: minor luminal irregularities  OM:    Right Coronary Artery: prox 95% lesion.   RPDA  RPL    Ramus Intermed:    DOMINANCE: Right    ACCESS: r radial  CLOSURE: d stat    INTERVENTION  IMPLANTS: RADHA x1 in pRCA    POST-OP DIAGNOSIS: unstable angina, PCI to RCA          PLAN OF CARE  [ ] D/C Home today  [ ]  D/C in AM  [x ] Return to In-patient bed  [ ] Admit for observation  [ ] Return for staged procedure:  [ ] CT Surgery consult called  [x ]  Continue DAPT, B-blocker & Statin therapy

## 2019-12-09 NOTE — H&P ADULT - HISTORY OF PRESENT ILLNESS
59y Male with a PMHx  mild mitral valve prolapse, +FH CAD, gastric ulcer, HTN, never smoker, chronic back pain, focal dystonia presented to [facility] from Cardiologist Dr. burroughs office after 5 days of intermittent chest pain. He says that cp radiated to his neck and located in mid chest. He described it as a pressure pain.   EKG was  concerning for acute coronary syndrome, which prompted a visit to the emergency room. Pt now s/p cath. Denies any cp, sob, abd pan, n/v 59y Male with a PMHx  mild mitral valve prolapse, +FH CAD, gastric ulcer, HTN, never smoker, chronic back pain, focal dystonia presented to [facility] from Cardiologist Dr. burroughs office after 5 days of intermittent chest pain. He says that cp radiated to his neck and located in mid chest. He described it as a pressure pain which started to increase in frequency last Tuesday/Wednesday. As he was walking from the lot to the office, the pressure was worsening, so he presented to Dr. Burroughs. EKG was  concerning for acute coronary syndrome, which prompted a visit to the emergency room. Pt now s/p cath. Denies any cp, sob, abd mckeon, n/v.

## 2019-12-09 NOTE — H&P ADULT - NSHPPHYSICALEXAM_GEN_ALL_CORE
Constitutional: AAOx3, NAD  HEENT: atraumatic, normocephalic  Cardiovascular: NS1, S2, RRR, no murmurs, rubs, gallops  Pulmonary: clear to auscultation b/l; non-tachypneic, non-cyanotic  Gastrointestinal: soft, nontender, nondistended; + bowel sounds in all quadrants  Extremities: slight erythema at site of cath insertion on right forearm; otherwise unremarkable  Neurological: non-focal

## 2019-12-09 NOTE — ED PROVIDER NOTE - DIAGNOSTIC INTERPRETATION
ER Physician: RIO Ferraro M.D.  CHEST XRAY INTERPRETATION: lungs clear, heart shadow normal, bony structures intact

## 2019-12-09 NOTE — ED PROVIDER NOTE - OBJECTIVE STATEMENT
58 Y/O M HTN, DYSTONIA (ON KLONOPIN), NONSMOKER C/O 4-5 DAYS OF CP. CP IS L SIDED AND DESCRIBED AS "DULL PAIN." PAIN WAS INITIALLY EXERTIONAL AND 3 DAYS AGO BEGAN TO OCCUR AT REST. CP RADIATES TO B/L SHOULDERS AND ARMS. + ASSOCIATED DIZZINESS. NO SOB, DIAPHORESIS, NAUSEA. FHX SUDDEN DEATH AT AGE 55 Y/O. NO ABD PAIN, BACK PAIN. NO LEG PAIN OR EDEMA. PT SEEN IN THE OFFICE BY DR. CORTES TODAY AND REFERERD TO THE ED FOR CARDIAC CATH THIS AFTERNOON.

## 2019-12-09 NOTE — CONSULT NOTE ADULT - ASSESSMENT
UNstable Angina  - Aspirin 325  - 2d echo  - serial cardiac enzymes and ekgs  - monitor on telemetry  - cardiac cath today  - lipitor 80 mg QHS  - if patient develops worsening pain, start nitro drip and contact cardiac fellow

## 2019-12-09 NOTE — H&P CARDIOLOGY - HISTORY OF PRESENT ILLNESS
Patient is a 59y Male PMH: mild mitral valve prolapse, +FH CAD, gastric ulcer, HTN, never smoker, chronic back pain, focal    dystonia  PSH: s/p hernia repair   Pt reports intermittent CP at rest and with exertion radiating to b/l arm and shoulders over past week, pt presented to ED for unstable angina, [facility] recommended      Vital Signs Last 24 Hrs  T(C): 37.2 (09 Dec 2019 12:06), Max: 37.2 (09 Dec 2019 12:06)  T(F): 98.9 (09 Dec 2019 12:06), Max: 98.9 (09 Dec 2019 12:06)  HR: 65 (09 Dec 2019 12:06) (65 - 65)  BP: 143/71 (09 Dec 2019 12:06) (143/71 - 143/71)  BP(mean): --  RR: 18 (09 Dec 2019 12:06) (18 - 18)  SpO2: 96% (09 Dec 2019 12:06) (96% - 96%)    REVIEW OF SYSTEMS:  CONSTITUTIONAL: No fever, weight loss, or fatigue  CARDIOLOGY: PAtient denies chest pain, shortness of breath or syncopal episodes.   RESPIRATORY: denies shortness of breath, wheezing   NEUROLOGICAL: NO weakness, no focal deficits to report.  GI: no BRBPR, no N,V,diarrhea.     PHYSICAL EXAM:  · CONSTITUTIONAL:	Well-developed, well nourished    ·RESPIRATORY:   airway patent; breath sounds equal; good air movement; respirations non-labored; clear to auscultation bilaterally; no chest wall tenderness; no intercostal retractions; no rales,rhonchi or wheeze  · CARDIOVASCULAR	regular rate and rhythm  no rub  no murmur  normal PMI  · EXTREMITIES: No cyanosis, clubbing or edema  · VASCULAR: 	Equal and normal pulses (carotid, femoral, dorsalis pedis)  	      LABS:                        15.5   4.75  )-----------( 203      ( 09 Dec 2019 12:50 )             44.8     12-09    138  |  101  |  16  ----------------------------<  101<H>  4.2   |  21  |  0.9    Ca    9.2      09 Dec 2019 12:50  Mg     2.2     12-09    TPro  6.9  /  Alb  4.2  /  TBili  0.9  /  DBili  x   /  AST  23  /  ALT  22  /  AlkPhos  57  12-09    CARDIAC MARKERS ( 09 Dec 2019 12:50 )  x     / 0.05 ng/mL / x     / x     / x

## 2019-12-09 NOTE — PATIENT PROFILE ADULT - NSPROHMSYMPCOND_GEN_A_NUR
The types of intraocular lenses were reviewed with the patient along with a discussion of their various strengths and weaknesses. cardiovascular

## 2019-12-10 ENCOUNTER — TRANSCRIPTION ENCOUNTER (OUTPATIENT)
Age: 59
End: 2019-12-10

## 2019-12-10 VITALS
DIASTOLIC BLOOD PRESSURE: 65 MMHG | SYSTOLIC BLOOD PRESSURE: 113 MMHG | TEMPERATURE: 96 F | WEIGHT: 186.95 LBS | HEART RATE: 57 BPM | RESPIRATION RATE: 18 BRPM

## 2019-12-10 LAB
ANION GAP SERPL CALC-SCNC: 13 MMOL/L — SIGNIFICANT CHANGE UP (ref 7–14)
BASOPHILS # BLD AUTO: 0.03 K/UL — SIGNIFICANT CHANGE UP (ref 0–0.2)
BASOPHILS NFR BLD AUTO: 0.5 % — SIGNIFICANT CHANGE UP (ref 0–1)
BUN SERPL-MCNC: 19 MG/DL — SIGNIFICANT CHANGE UP (ref 10–20)
CALCIUM SERPL-MCNC: 8.6 MG/DL — SIGNIFICANT CHANGE UP (ref 8.5–10.1)
CHLORIDE SERPL-SCNC: 105 MMOL/L — SIGNIFICANT CHANGE UP (ref 98–110)
CO2 SERPL-SCNC: 24 MMOL/L — SIGNIFICANT CHANGE UP (ref 17–32)
CREAT SERPL-MCNC: 0.9 MG/DL — SIGNIFICANT CHANGE UP (ref 0.7–1.5)
EOSINOPHIL # BLD AUTO: 0.18 K/UL — SIGNIFICANT CHANGE UP (ref 0–0.7)
EOSINOPHIL NFR BLD AUTO: 2.8 % — SIGNIFICANT CHANGE UP (ref 0–8)
GLUCOSE SERPL-MCNC: 90 MG/DL — SIGNIFICANT CHANGE UP (ref 70–99)
HCT VFR BLD CALC: 41.3 % — LOW (ref 42–52)
HCV AB S/CO SERPL IA: 0.17 S/CO — SIGNIFICANT CHANGE UP (ref 0–0.99)
HCV AB SERPL-IMP: SIGNIFICANT CHANGE UP
HGB BLD-MCNC: 13.8 G/DL — LOW (ref 14–18)
IMM GRANULOCYTES NFR BLD AUTO: 0.2 % — SIGNIFICANT CHANGE UP (ref 0.1–0.3)
LYMPHOCYTES # BLD AUTO: 1.26 K/UL — SIGNIFICANT CHANGE UP (ref 1.2–3.4)
LYMPHOCYTES # BLD AUTO: 19.9 % — LOW (ref 20.5–51.1)
MAGNESIUM SERPL-MCNC: 2.1 MG/DL — SIGNIFICANT CHANGE UP (ref 1.8–2.4)
MCHC RBC-ENTMCNC: 29.7 PG — SIGNIFICANT CHANGE UP (ref 27–31)
MCHC RBC-ENTMCNC: 33.4 G/DL — SIGNIFICANT CHANGE UP (ref 32–37)
MCV RBC AUTO: 88.8 FL — SIGNIFICANT CHANGE UP (ref 80–94)
MONOCYTES # BLD AUTO: 0.77 K/UL — HIGH (ref 0.1–0.6)
MONOCYTES NFR BLD AUTO: 12.2 % — HIGH (ref 1.7–9.3)
NEUTROPHILS # BLD AUTO: 4.07 K/UL — SIGNIFICANT CHANGE UP (ref 1.4–6.5)
NEUTROPHILS NFR BLD AUTO: 64.4 % — SIGNIFICANT CHANGE UP (ref 42.2–75.2)
NRBC # BLD: 0 /100 WBCS — SIGNIFICANT CHANGE UP (ref 0–0)
PLATELET # BLD AUTO: 179 K/UL — SIGNIFICANT CHANGE UP (ref 130–400)
POTASSIUM SERPL-MCNC: 4 MMOL/L — SIGNIFICANT CHANGE UP (ref 3.5–5)
POTASSIUM SERPL-SCNC: 4 MMOL/L — SIGNIFICANT CHANGE UP (ref 3.5–5)
RBC # BLD: 4.65 M/UL — LOW (ref 4.7–6.1)
RBC # FLD: 12.4 % — SIGNIFICANT CHANGE UP (ref 11.5–14.5)
SODIUM SERPL-SCNC: 142 MMOL/L — SIGNIFICANT CHANGE UP (ref 135–146)
TROPONIN T SERPL-MCNC: 0.06 NG/ML — CRITICAL HIGH
WBC # BLD: 6.32 K/UL — SIGNIFICANT CHANGE UP (ref 4.8–10.8)
WBC # FLD AUTO: 6.32 K/UL — SIGNIFICANT CHANGE UP (ref 4.8–10.8)

## 2019-12-10 PROCEDURE — 93306 TTE W/DOPPLER COMPLETE: CPT | Mod: 26

## 2019-12-10 PROCEDURE — 99239 HOSP IP/OBS DSCHRG MGMT >30: CPT

## 2019-12-10 PROCEDURE — 93010 ELECTROCARDIOGRAM REPORT: CPT

## 2019-12-10 RX ORDER — CLOPIDOGREL BISULFATE 75 MG/1
1 TABLET, FILM COATED ORAL
Qty: 0 | Refills: 0 | DISCHARGE
Start: 2019-12-10

## 2019-12-10 RX ORDER — CLOPIDOGREL BISULFATE 75 MG/1
1 TABLET, FILM COATED ORAL
Qty: 90 | Refills: 0
Start: 2019-12-10 | End: 2020-03-08

## 2019-12-10 RX ORDER — CELECOXIB 200 MG/1
1 CAPSULE ORAL
Qty: 0 | Refills: 0 | DISCHARGE

## 2019-12-10 RX ORDER — ASPIRIN/CALCIUM CARB/MAGNESIUM 324 MG
1 TABLET ORAL
Qty: 90 | Refills: 0
Start: 2019-12-10 | End: 2020-03-08

## 2019-12-10 RX ORDER — AMLODIPINE BESYLATE 2.5 MG/1
1 TABLET ORAL
Qty: 30 | Refills: 0
Start: 2019-12-10 | End: 2020-01-08

## 2019-12-10 RX ORDER — AMLODIPINE BESYLATE 2.5 MG/1
1 TABLET ORAL
Qty: 0 | Refills: 0 | DISCHARGE

## 2019-12-10 RX ORDER — PANTOPRAZOLE SODIUM 20 MG/1
1 TABLET, DELAYED RELEASE ORAL
Qty: 90 | Refills: 0
Start: 2019-12-10 | End: 2020-03-08

## 2019-12-10 RX ORDER — ASPIRIN/CALCIUM CARB/MAGNESIUM 324 MG
1 TABLET ORAL
Qty: 0 | Refills: 0 | DISCHARGE
Start: 2019-12-10

## 2019-12-10 RX ORDER — ROSUVASTATIN CALCIUM 5 MG/1
1 TABLET ORAL
Qty: 90 | Refills: 0
Start: 2019-12-10 | End: 2020-03-08

## 2019-12-10 RX ORDER — ACETAMINOPHEN 500 MG
1 TABLET ORAL
Qty: 120 | Refills: 0
Start: 2019-12-10 | End: 2020-01-08

## 2019-12-10 RX ADMIN — Medication 81 MILLIGRAM(S): at 11:10

## 2019-12-10 RX ADMIN — CHLORHEXIDINE GLUCONATE 1 APPLICATION(S): 213 SOLUTION TOPICAL at 05:34

## 2019-12-10 RX ADMIN — Medication 0.5 MILLIGRAM(S): at 09:46

## 2019-12-10 RX ADMIN — CLOPIDOGREL BISULFATE 75 MILLIGRAM(S): 75 TABLET, FILM COATED ORAL at 11:10

## 2019-12-10 RX ADMIN — AMLODIPINE BESYLATE 5 MILLIGRAM(S): 2.5 TABLET ORAL at 05:33

## 2019-12-10 RX ADMIN — Medication 1 MILLIGRAM(S): at 11:10

## 2019-12-10 NOTE — PROGRESS NOTE ADULT - SUBJECTIVE AND OBJECTIVE BOX
SEKOU HASTINGS 59y Male  MRN#: [id number]   CODE STATUS: FULL CODE      SUBJECTIVE  Patient is a 59y old Male who presents with a chief complaint of unstable angina (10 Dec 2019 10:13)  Currently admitted to medicine with the primary diagnosis of Unstable angina    Today is hospital day 1d, and this morning he is doing well s/p cath. No overnight events. No new episodes of chest pain, SOB, palpitations.     Anticipating discharge today; will f/u with Cardiology as outpatient.     OBJECTIVE  PAST MEDICAL & SURGICAL HISTORY  Focal dystonia  Gastric ulcer  HTN (hypertension)  H/O hernia repair    ALLERGIES:  penicillin (Unknown)  sulfa drugs (Unknown)    MEDICATIONS:  STANDING MEDICATIONS  amLODIPine   Tablet 5 milliGRAM(s) Oral daily  aspirin enteric coated 81 milliGRAM(s) Oral daily  atorvastatin 80 milliGRAM(s) Oral at bedtime  chlorhexidine 4% Liquid 1 Application(s) Topical <User Schedule>  clonazePAM  Tablet 0.5 milliGRAM(s) Oral two times a day  clopidogrel Tablet 75 milliGRAM(s) Oral daily  enoxaparin Injectable 40 milliGRAM(s) SubCutaneous at bedtime  sodium chloride 0.9%. 1000 milliLiter(s) IV Continuous <Continuous>  trihexyphenidyl 1 milliGRAM(s) Oral daily    PRN MEDICATIONS      VITAL SIGNS: Last 24 Hours  T(C): 35.8 (10 Dec 2019 04:27), Max: 37.3 (09 Dec 2019 20:00)  T(F): 96.5 (10 Dec 2019 04:27), Max: 99.2 (09 Dec 2019 20:00)  HR: 57 (10 Dec 2019 04:27) (54 - 65)  BP: 113/65 (10 Dec 2019 04:27) (113/65 - 143/71)  BP(mean): --  RR: 18 (10 Dec 2019 04:27) (16 - 18)  SpO2: 96% (09 Dec 2019 12:06) (96% - 96%)    LABS:                        13.8   6.32  )-----------( 179      ( 10 Dec 2019 04:40 )             41.3     12-10    142  |  105  |  19  ----------------------------<  90  4.0   |  24  |  0.9    Ca    8.6      10 Dec 2019 04:40  Mg     2.1     12-10    TPro  6.9  /  Alb  4.2  /  TBili  0.9  /  DBili  x   /  AST  23  /  ALT  22  /  AlkPhos  57  12-09          Troponin T, Serum: 0.06 ng/mL <HH> (12-10-19 @ 04:40)  Troponin T, Serum: 0.05 ng/mL <HH> (12-09-19 @ 21:33)  Troponin T, Serum: 0.05 ng/mL <HH> (12-09-19 @ 12:50)      CARDIAC MARKERS ( 10 Dec 2019 04:40 )  x     / 0.06 ng/mL / x     / x     / x      CARDIAC MARKERS ( 09 Dec 2019 21:33 )  x     / 0.05 ng/mL / x     / x     / x      CARDIAC MARKERS ( 09 Dec 2019 12:50 )  x     / 0.05 ng/mL / x     / x     / x          PHYSICAL EXAM:    GENERAL: NAD, well-developed, AAOx3  HEENT:  Atraumatic, Normocephalic. EOMI, PERRLA, conjunctiva and sclera clear, No JVD  PULMONARY: Clear to auscultation bilaterally; No wheeze  CARDIOVASCULAR: Regular rate and rhythm; No murmurs, rubs, or gallops  GASTROINTESTINAL: Soft, Nontender, Nondistended; Bowel sounds present  MUSCULOSKELETAL:  2+ Peripheral Pulses, No clubbing, cyanosis, or edema  NEUROLOGY: non-focal  SKIN: No rashes or lesions

## 2019-12-10 NOTE — PROGRESS NOTE ADULT - ASSESSMENT
59y Male with a PMHx  mild mitral valve prolapse, +FH CAD, gastric ulcer, HTN, never smoker, chronic back pain, focal dystonia presented to [facility] from Cardiologist Dr. burroughs office after 5 days of intermittent chest pain.    1.	NSTEMI  2.	S/P RCA RADHA  3.	HTN  4.	Focal Dystonia  5.	H/O Gastric ulcer         PLAN:    ·	S/P cath and RADHA in RCA  ·	On ASA, Plavix, Lipitor and amlodipine  ·	ECHO 59y Male with a PMHx  mild mitral valve prolapse, +FH CAD, gastric ulcer, HTN, never smoker, chronic back pain, focal dystonia presented to [facility] from Cardiologist Dr. burroughs office after 5 days of intermittent chest pain.    1.	ACS  2.	S/P RCA RADHA  3.	HTN  4.	Focal Dystonia  5.	H/O Gastric ulcer         PLAN:    ·	S/P cath and RADHA in RCA  ·	Cardiology F/U noted.   ·	As per cardiology pt has normal EF  ·	Cont DAPT and other meds recommended by card.  ·	No BB due to bradycardia  ·	D/C home    *. Med rec reviewed. Plan of care D/W the pt. Time spent 36 minutes.

## 2019-12-10 NOTE — CHART NOTE - NSCHARTNOTEFT_GEN_A_CORE
<<<RESIDENT DISCHARGE NOTE>>>     SEKOU HASTINGS  MRN-[id number]    VITAL SIGNS:  T(F): 96.5 (12-10-19 @ 04:27), Max: 99.2 (12-09-19 @ 20:00)  HR: 57 (12-10-19 @ 04:27)  BP: 113/65 (12-10-19 @ 04:27)  SpO2: 96% (12-09-19 @ 12:06)  Weight (kg): 83.9 (12-09-19 @ 18:35)  BMI (kg/m2): 29.9 (12-09-19 @ 18:35)    PHYSICAL EXAMINATION:  General: No acute distress, denies pain  Head & Neck: Atraumatic, no bruits, no thyromegaly  Pulmonary: Lung sounds clear and equal bilaterally, no crackles or wheeze  Cardiovascular: Regular rate and rhythm, no bruits, murmurs, or rubs  Gastrointestinal/Abdomen & Pelvis: Nondistended, nontender to palpation, no bruits, no masses  Neurologic/Motor: AAOx3, no tremors, no fasciculations, CN II-XII intact bilaterally     TEST RESULTS:                        13.8   6.32  )-----------( 179      ( 10 Dec 2019 04:40 )             41.3       12-10    142  |  105  |  19  ----------------------------<  90  4.0   |  24  |  0.9    Ca    8.6      10 Dec 2019 04:40  Mg     2.1     12-10    TPro  6.9  /  Alb  4.2  /  TBili  0.9  /  DBili  x   /  AST  23  /  ALT  22  /  AlkPhos  57  12-09      FINAL DISCHARGE INTERVIEW:  Resident(s) Present: (Name:_____Dr. Yury Castillo MD________), RN Present: (Name:  _____RN______)    DISCHARGE MEDICATION RECONCILIATION  reviewed with Attending (Name:____MD Armando______)    DISPOSITION:   [ X ] Home,    [  ] Home with Visiting Nursing Services,   [    ]  SNF/ NH,    [   ] Acute Rehab (4A),   [   ] Other (Specify:_________)

## 2019-12-10 NOTE — PROGRESS NOTE ADULT - SUBJECTIVE AND OBJECTIVE BOX
VENKATA SEKOU  59y Male    CHIEF COMPLAINT:    Patient is a 59y old  Male who presents with a chief complaint of unstable angina (09 Dec 2019 17:22)      INTERVAL HPI/OVERNIGHT EVENTS:    Patient seen and examined.    ROS: All other systems are negative.    Vital Signs:    T(F): 96.5 (12-10-19 @ 04:27), Max: 99.2 (19 @ 20:00)  HR: 57 (12-10-19 @ 04:27) (54 - 65)  BP: 113/65 (12-10-19 @ 04:27) (113/65 - 143/71)  RR: 18 (12-10-19 @ 04:27) (16 - 18)  SpO2: 96% (19 @ 12:06) (96% - 96%)  I&O's Summary    09 Dec 2019 07:01  -  10 Dec 2019 07:00  --------------------------------------------------------  IN: 750 mL / OUT: 0 mL / NET: 750 mL      Daily Height in cm: 167.64 (09 Dec 2019 18:35)    Daily Weight in k.8 (10 Dec 2019 04:27)  CAPILLARY BLOOD GLUCOSE          PHYSICAL EXAM:    GENERAL:  NAD  SKIN: No rashes or lesions  HENT: Atrumatic. Normocephalic. PERRL. Moist membranes.  NECK: Supple, No JVD. No lymphadenopathy.  PULMONARY: CTA B/L. No wheezing. No rales  CVS: Normal S1, S2. Rate and Rythm are regular. No murmurs.  ABDOMEN/GI: Soft, Nontender, Nondistended; BS present  EXTREMITIES: Peripheral pulses intact. No edema B/L LE.  NEUROLOGIC:  No motor or sensory deficit.  PSYCH: Alert & oriented x 3    Consultant(s) Notes Reviewed:  [x ] YES  [ ] NO  Care Discussed with Consultants/Other Providers [ x] YES  [ ] NO    EKG reviewed  Telemetry reviewed    LABS:                        13.8   6.32  )-----------( 179      ( 10 Dec 2019 04:40 )             41.3     12-10    142  |  105  |  19  ----------------------------<  90  4.0   |  24  |  0.9    Ca    8.6      10 Dec 2019 04:40  Mg     2.1     12-10    TPro  6.9  /  Alb  4.2  /  TBili  0.9  /  DBili  x   /  AST  23  /  ALT  22  /  AlkPhos  57          Trop 0.06, CKMB --, CK --, 12-10-19 @ 04:40  Trop 0.05, CKMB --, CK --, 19 @ 21:33  Trop 0.05, CKMB --, CK --, 19 @ 12:50        RADIOLOGY & ADDITIONAL TESTS:      Imaging or report Personally Reviewed:  [ ] YES  [ ] NO    Medications:  Standing  amLODIPine   Tablet 5 milliGRAM(s) Oral daily  aspirin enteric coated 81 milliGRAM(s) Oral daily  atorvastatin 80 milliGRAM(s) Oral at bedtime  chlorhexidine 4% Liquid 1 Application(s) Topical <User Schedule>  clonazePAM  Tablet 0.5 milliGRAM(s) Oral two times a day  clopidogrel Tablet 75 milliGRAM(s) Oral daily  enoxaparin Injectable 40 milliGRAM(s) SubCutaneous at bedtime  sodium chloride 0.9%. 1000 milliLiter(s) IV Continuous <Continuous>  trihexyphenidyl 1 milliGRAM(s) Oral daily    PRN Meds      Case discussed with resident    Care discussed with pt/family VENKATA SEKOU  59y Male    CHIEF COMPLAINT:    Patient is a 59y old  Male who presents with a chief complaint of unstable angina (09 Dec 2019 17:22)      INTERVAL HPI/OVERNIGHT EVENTS:    Patient seen and examined. No cp. No sob. Feels good.     ROS: All other systems are negative.    Vital Signs:    T(F): 96.5 (12-10-19 @ 04:27), Max: 99.2 (19 @ 20:00)  HR: 57 (12-10-19 @ 04:27) (54 - 65)  BP: 113/65 (12-10-19 @ 04:27) (113/65 - 143/71)  RR: 18 (12-10-19 @ 04:27) (16 - 18)  SpO2: 96% (19 @ 12:06) (96% - 96%)  I&O's Summary    09 Dec 2019 07:01  -  10 Dec 2019 07:00  --------------------------------------------------------  IN: 750 mL / OUT: 0 mL / NET: 750 mL      Daily Height in cm: 167.64 (09 Dec 2019 18:35)    Daily Weight in k.8 (10 Dec 2019 04:27)  CAPILLARY BLOOD GLUCOSE          PHYSICAL EXAM:    GENERAL:  NAD  SKIN: No rashes or lesions  HENT: Atraumatic Normocephalic. PERRL. Moist membranes.  NECK: Supple, No JVD. No lymphadenopathy.  PULMONARY: CTA B/L. No wheezing. No rales  CVS: Normal S1, S2. Rate and Rythmol are regular. No murmurs.  ABDOMEN/GI: Soft, Nontender, Nondistended; BS present  EXTREMITIES: Peripheral pulses intact. No edema B/L LE.  NEUROLOGIC:  No motor or sensory deficit.  PSYCH: Alert & oriented x 3    Consultant(s) Notes Reviewed:  [x ] YES  [ ] NO  Care Discussed with Consultants/Other Providers [ x] YES  [ ] NO    EKG reviewed  Telemetry reviewed    LABS:                        13.8   6.32  )-----------( 179      ( 10 Dec 2019 04:40 )             41.3     12-10    142  |  105  |  19  ----------------------------<  90  4.0   |  24  |  0.9    Ca    8.6      10 Dec 2019 04:40  Mg     2.1     12-10    TPro  6.9  /  Alb  4.2  /  TBili  0.9  /  DBili  x   /  AST  23  /  ALT  22  /  AlkPhos  57          Trop 0.06, CKMB --, CK --, 12-10-19 @ 04:40  Trop 0.05, CKMB --, CK --, 19 @ 21:33  Trop 0.05, CKMB --, CK --, 19 @ 12:50        RADIOLOGY & ADDITIONAL TESTS:      Imaging or report Personally Reviewed:  [ ] YES  [ ] NO    Medications:  Standing  amLODIPine   Tablet 5 milliGRAM(s) Oral daily  aspirin enteric coated 81 milliGRAM(s) Oral daily  atorvastatin 80 milliGRAM(s) Oral at bedtime  chlorhexidine 4% Liquid 1 Application(s) Topical <User Schedule>  clonazePAM  Tablet 0.5 milliGRAM(s) Oral two times a day  clopidogrel Tablet 75 milliGRAM(s) Oral daily  enoxaparin Injectable 40 milliGRAM(s) SubCutaneous at bedtime  sodium chloride 0.9%. 1000 milliLiter(s) IV Continuous <Continuous>  trihexyphenidyl 1 milliGRAM(s) Oral daily    PRN Meds      Case discussed with resident    Care discussed with pt/family

## 2019-12-10 NOTE — DISCHARGE NOTE PROVIDER - NSDCCPCAREPLAN_GEN_ALL_CORE_FT
PRINCIPAL DISCHARGE DIAGNOSIS  Diagnosis: Unstable angina  Assessment and Plan of Treatment: 1. Unstable Angina   Patient presented to Dr. Solis for chest pain that was concerning for ACS on EKG. The patient was taken to the cath lab and RCA disease was found. 1 RADHA was deployed. The patient was instructed to take DAPT with ASA and Plavix, continue home enalapril and amlodipine, and add protonix daily to his medication regimen for bowel protection. Follow-up with Dr. Solis in 1 week.

## 2019-12-10 NOTE — PROGRESS NOTE ADULT - SUBJECTIVE AND OBJECTIVE BOX
SUBJ:  no cp or sob     MEDICATIONS  (STANDING):  amLODIPine   Tablet 5 milliGRAM(s) Oral daily  aspirin enteric coated 81 milliGRAM(s) Oral daily  atorvastatin 80 milliGRAM(s) Oral at bedtime  chlorhexidine 4% Liquid 1 Application(s) Topical <User Schedule>  clonazePAM  Tablet 0.5 milliGRAM(s) Oral two times a day  clopidogrel Tablet 75 milliGRAM(s) Oral daily  enoxaparin Injectable 40 milliGRAM(s) SubCutaneous at bedtime  sodium chloride 0.9%. 1000 milliLiter(s) (100 mL/Hr) IV Continuous <Continuous>  trihexyphenidyl 1 milliGRAM(s) Oral daily    MEDICATIONS  (PRN):            Vital Signs Last 24 Hrs  T(C): 35.8 (10 Dec 2019 04:27), Max: 37.3 (09 Dec 2019 20:00)  T(F): 96.5 (10 Dec 2019 04:27), Max: 99.2 (09 Dec 2019 20:00)  HR: 57 (10 Dec 2019 04:27) (54 - 65)  BP: 113/65 (10 Dec 2019 04:27) (113/65 - 143/71)  BP(mean): --  RR: 18 (10 Dec 2019 04:27) (16 - 18)  SpO2: 96% (09 Dec 2019 12:06) (96% - 96%)          PHYSICAL EXAM:  · CONSTITUTIONAL:	Well-developed, well nourished    BMI-  ·RESPIRATORY:   airway patent; breath sounds equal; good air movement; respirations non-labored; clear to auscultation bilaterally; no chest wall tenderness; no intercostal retractions; no rales,rhonchi or wheeze  · CARDIOVASCULAR	regular rate and rhythm  no rub  no murmur  normal PMI  · EXTREMITIES: No cyanosis, clubbing or edema  · VASCULAR: 	Equal and normal pulses (carotid, femoral, dorsalis pedis) right wrist puse 2+   	  TELEMETRY: sinus morenita     ECG:    TTE:    LABS:                        13.8   6.32  )-----------( 179      ( 10 Dec 2019 04:40 )             41.3     12-10    142  |  105  |  19  ----------------------------<  90  4.0   |  24  |  0.9    Ca    8.6      10 Dec 2019 04:40  Mg     2.1     12-10    TPro  6.9  /  Alb  4.2  /  TBili  0.9  /  DBili  x   /  AST  23  /  ALT  22  /  AlkPhos  57  12-09    CARDIAC MARKERS ( 10 Dec 2019 04:40 )  x     / 0.06 ng/mL / x     / x     / x      CARDIAC MARKERS ( 09 Dec 2019 21:33 )  x     / 0.05 ng/mL / x     / x     / x      CARDIAC MARKERS ( 09 Dec 2019 12:50 )  x     / 0.05 ng/mL / x     / x     / x              I&O's Summary    09 Dec 2019 07:01  -  10 Dec 2019 07:00  --------------------------------------------------------  IN: 750 mL / OUT: 0 mL / NET: 750 mL      BNP  RADIOLOGY & ADDITIONAL STUDIES:    IMPRESSION AND PLAN:  S/p RADHA of RCA   NL EF   no arrythmia or chf pulses intact     rec     can d/c tele and d/c to home   asa and plavix and atorvastatin will f/iu in office 1-2 weeks     may need PCsk9 meds in future since some probbnlems with statin in past

## 2019-12-10 NOTE — DISCHARGE NOTE NURSING/CASE MANAGEMENT/SOCIAL WORK - PATIENT PORTAL LINK FT
You can access the FollowMyHealth Patient Portal offered by [facility] by registering at the following website: [email or web address]. By joining [employer]’s FollowMyHealth portal, you will also be able to view your health information using other applications (apps) compatible with our system.

## 2019-12-10 NOTE — PROGRESS NOTE ADULT - SUBJECTIVE AND OBJECTIVE BOX
Cardiology Follow up    SEKOU HASTINGS   59y Male  PAST MEDICAL & SURGICAL HISTORY:  Focal dystonia  Gastric ulcer  HTN (hypertension)  H/O hernia repair       HPI:  59y Male with a PMHx  mild mitral valve prolapse, +FH CAD, gastric ulcer, HTN, never smoker, chronic back pain, focal dystonia presented to [facility] from Cardiologist Dr. burroughs office after 5 days of intermittent chest pain. He says that cp radiated to his neck and located in mid chest. He described it as a pressure pain which started to increase in frequency last Tuesday/Wednesday. As he was walking from the lot to the office, the pressure was worsening, so he presented to Dr. Burroughs. EKG was  concerning for acute coronary syndrome, which prompted a visit to the emergency room. Pt now s/p cath. Denies any cp, sob, abd mckeon, n/v. (09 Dec 2019 17:22)    Allergies    penicillin (Unknown)  sulfa drugs (Unknown)    Intolerances    Patient without complaints. Pt ambulated without issues/symptoms  Denies CP, SOB, palpitations, or dizziness  No events on telemetry overnight    Vital Signs Last 24 Hrs  T(C): 35.8 (10 Dec 2019 04:27), Max: 37.3 (09 Dec 2019 20:00)  T(F): 96.5 (10 Dec 2019 04:27), Max: 99.2 (09 Dec 2019 20:00)  HR: 57 (10 Dec 2019 04:27) (54 - 65)  BP: 113/65 (10 Dec 2019 04:27) (113/65 - 143/71)  BP(mean): --  RR: 18 (10 Dec 2019 04:27) (16 - 18)  SpO2: 96% (09 Dec 2019 12:06) (96% - 96%)    MEDICATIONS  (STANDING):  amLODIPine   Tablet 5 milliGRAM(s) Oral daily  aspirin enteric coated 81 milliGRAM(s) Oral daily  atorvastatin 80 milliGRAM(s) Oral at bedtime  chlorhexidine 4% Liquid 1 Application(s) Topical <User Schedule>  clonazePAM  Tablet 0.5 milliGRAM(s) Oral two times a day  clopidogrel Tablet 75 milliGRAM(s) Oral daily  enoxaparin Injectable 40 milliGRAM(s) SubCutaneous at bedtime  sodium chloride 0.9%. 1000 milliLiter(s) (100 mL/Hr) IV Continuous <Continuous>  trihexyphenidyl 1 milliGRAM(s) Oral daily    MEDICATIONS  (PRN):      REVIEW OF SYSTEMS:          CONSTITUTIONAL: No weakness, fevers or chills          EYES/ENT: No visual changes;  No vertigo or throat pain           NECK: No pain or stiffness          RESPIRATORY: No cough, wheezing, hemoptysis          CARDIOVASCULAR: no pain, no KIMBLE, no palpitations           GASTROINTESTINAL: No abdominal or epigastric pain. No nausea, vomiting, or hematemesis;           GENITOURINARY: No dysuria, frequency or hematuria          NEUROLOGICAL: No numbness or weakness          SKIN: No itching, rashes    PHYSICAL EXAM:           CONSTITUTIONAL: Well-developed; well-nourished; in no acute distress  	SKIN: warm, dry  	HEAD: Normocephalic; atraumatic  	EYES: PERRL.  	ENT: No nasal discharge, airway clear, mucous membranes moist  	NECK: Supple; non tender.  	CARD: +S1, +S2, no murmurs, gallops, or rubs. Regular rate and rhythm    	RESP: No wheezes, rales or rhonchi. CTA B/L  	ABD: soft ntnd, + BS x 4 quadrants  	EXT: moves all extremities,  no clubbing, cyanosis or edema  	NEURO: Alert and oriented x3, no focal deficits          PSYCH: Cooperative, appropriate          VASCULAR:  +2 Rad / +2 PTs / + 2 DPs          EXTREMITY:             Right Radial: pressure dressing removed, access site soft, no hematoma, no pain, + pulses/same as baseline, no sign of infection, no numbness            EC/10/2019  NSR hr 62  no new ischemic changes    < from: 12 Lead ECG (19 @ 12:08) >  Ventricular Rate 67 BPM    Atrial Rate 67 BPM    P-R Interval 150 ms    QRS Duration 90 ms    Q-T Interval 394 ms    QTC Calculation(Bezet) 416 ms    P Axis 49 degrees    R Axis 102 degrees    T Axis 2 degrees    Diagnosis Line Normal sinus rhythm  Rightward axis  Borderline ECG    Confirmed by VINCENT AUSTIN MD ([id number]) on 2019 4:16:51 PM         LABS:                        13.8   6.32  )-----------( 179      ( 10 Dec 2019 04:40 )             41.3     12-10    142  |  105  |  19  ----------------------------<  90  4.0   |  24  |  0.9    Ca    8.6      10 Dec 2019 04:40  Mg     2.1     12-10    TPro  6.9  /  Alb  4.2  /  TBili  0.9  /  DBili  x   /  AST  23  /  ALT  22  /  AlkPhos  57      CARDIAC MARKERS ( 10 Dec 2019 04:40 )  x     / 0.06 ng/mL / x     / x     / x      CARDIAC MARKERS ( 09 Dec 2019 21:33 )  x     / 0.05 ng/mL / x     / x     / x      CARDIAC MARKERS ( 09 Dec 2019 12:50 )  x     / 0.05 ng/mL / x     / x     / x          Magnesium, Serum: 2.1 mg/dL [1.8 - 2.4] (12-10-19 @ 04:40)  Magnesium, Serum: 2.2 mg/dL [1.8 - 2.4] (19 @ 12:50)  LIVER FUNCTIONS - ( 09 Dec 2019 12:50 )  Alb: 4.2 g/dL / Pro: 6.9 g/dL / ALK PHOS: 57 U/L / ALT: 22 U/L / AST: 23 U/L / GGT: x             A/P:  I discussed the case with Cardiologist Dr. Burroughs and recommend the following:    S/P PCI:  RCA x 1 RADHA                     Continue DAPT ( Aspirin 81 mg PO Daily and Plavix 75 mg PO Daily ), No B-Blocker due to Bradycardia, Statin Therapy                   Continue Amlodipine and Enalapril from home                    Add Pantoprazole                    Use Tylenol instead of Celebrex for pain                    Patient given 30 day supply of ( Aspirin 81 mg daily and Plavix 75 mg daily ) to take at home                   Patient agreeing to take DAPT for at least one year or as directed by cardiologist                    Pt given instructions on importance of taking antiplatelet medication or risk acute stent thrombosis/death                   Post cath instructions, access site care and activity restrictions reviewed with patient                     Discussed with patient to return to hospital if experience chest pain, shortness breath, dizziness and site bleeding                   Aggressive risk factor modification, diet counseling, smoking cessation discussed with patient                       Can discharge patient from cardiac standpoint                    Follow up with Cardiology Dr. Burroughs in one to two weeks.  Instructed to call and make an appointment

## 2019-12-10 NOTE — DISCHARGE NOTE PROVIDER - NSDCMRMEDTOKEN_GEN_ALL_CORE_FT
acetaminophen 650 mg oral tablet, extended release: 1 tab(s) orally every 6 hours, As Needed   amLODIPine 5 mg oral tablet: 1 tab(s) orally once a day  Artane: 1 milligram(s) orally every other day  aspirin 81 mg oral delayed release tablet: 1 tab(s) orally once a day  clopidogrel 75 mg oral tablet: 1 tab(s) orally once a day  Crestor 20 mg oral tablet: 1 tab(s) orally once a day (at bedtime)   enalapril 2.5 mg oral tablet: 1 tab(s) orally once a day  KlonoPIN 0.5 mg oral tablet: 0.5 milligram(s) orally 2 times a day  pantoprazole 40 mg oral delayed release tablet: 1 tab(s) orally once a day

## 2019-12-10 NOTE — DISCHARGE NOTE PROVIDER - NSDCFUADDINST_GEN_ALL_CORE_FT
Please follow up with your primary care physician within 1 week of your discharge from [facility]. Please take all medications as prescribed. If you experience any worsening or recurrence of your symptoms, please call 9-1-1 immediately or report to the nearest Emergency Department. If you have any questions or concerns, please do not hesitate to call the hospital at [phone].

## 2019-12-10 NOTE — DISCHARGE NOTE PROVIDER - HOSPITAL COURSE
Mr. Jerez is a 59-year-old male with a past medical history of mild mitral valve prolapse, +FH of CAD, gastric ulcer, HTN, never smoker, chronic back pain, focal dystonia presented to [facility] from Cardiologist Dr. burroughs office after 5 days of intermittent chest pain. He says that chest pain radiated to his neck and located in mid-chest. He described it as a pressure pain which started to increase in frequency last Tuesday/Wednesday. As he was walking from the lot to the office, the pressure was worsening, so he presented to Dr. Burroughs. EKG was concerning for acute coronary syndrome, which prompted a visit to the emergency room. Pt now s/p cath. Denies any cp, sob, abd mckeon, n/v. The patient was treated for the following conditions while admitted to the hospital:        1. Unstable Angina     Patient presented to Dr. Burroughs for chest pain that was concerning for ACS on EKG. The patient was taken to the cath lab and RCA disease was found. 1 RADHA was deployed. The patient was instructed to take DAPT with ASA and Plavix, continue home enalapril and amlodipine, and add protonix daily to his medication regimen for bowel protection. Follow-up with Dr. Burroughs in 1 week. Follow-up Echo results with Dr. Burroughs. Stop Celebrex and use acetaminophen for pain.

## 2019-12-10 NOTE — PROGRESS NOTE ADULT - ASSESSMENT
The patient is a 59y male with a PMHx of mild mitral valve prolapse, +FH CAD, gastric ulcer, HTN, never smoker, chronic back pain, focal dystonia presented to [facility] from Cardiologist Dr. Solis's office after 5 days of intermittent chest pain, admitted for unstable angina, s/p cardiac cath today 12/9 s/p RADHA in RCA.    #Unstable angina s/p cardiac cath 12/9 s/p RADHA in RCA  - Discharge with Plavix 75 mg po qd, Aspirin 81 mg po qd, and Crestor 20 mg po qhs  - Add Pantoprazole to prevent Gastritis   - Follow up with Cardiology, Dr. Solis, as outpatient     #HTN  - Continue home meds, Amlodipine and Enalapril    #Chronic back pain  - Follow up with PMD; Switch from Celebrex to Tylenol due to bleeding risk in conjunction w/ other medications    #Anxiety  - Continue Klonopin 0.5 mg PO BID    #Dispo- Anticipating discharge today  #DVT ppx- Lovenox 40 mg PO qHS  #Code status: FULL CODE  #Diet- DASH

## 2019-12-11 PROBLEM — I10 ESSENTIAL (PRIMARY) HYPERTENSION: Chronic | Status: ACTIVE | Noted: 2019-12-09

## 2019-12-11 PROBLEM — G24.8 OTHER DYSTONIA: Chronic | Status: ACTIVE | Noted: 2019-12-09

## 2019-12-11 PROBLEM — K25.9 GASTRIC ULCER, UNSPECIFIED AS ACUTE OR CHRONIC, WITHOUT HEMORRHAGE OR PERFORATION: Chronic | Status: ACTIVE | Noted: 2019-12-09

## 2019-12-13 DIAGNOSIS — I24.9 ACUTE ISCHEMIC HEART DISEASE, UNSPECIFIED: ICD-10-CM

## 2019-12-13 DIAGNOSIS — I10 ESSENTIAL (PRIMARY) HYPERTENSION: ICD-10-CM

## 2019-12-13 DIAGNOSIS — M54.9 DORSALGIA, UNSPECIFIED: ICD-10-CM

## 2019-12-13 DIAGNOSIS — R00.1 BRADYCARDIA, UNSPECIFIED: ICD-10-CM

## 2019-12-13 DIAGNOSIS — Z88.2 ALLERGY STATUS TO SULFONAMIDES: ICD-10-CM

## 2019-12-13 DIAGNOSIS — Z88.0 ALLERGY STATUS TO PENICILLIN: ICD-10-CM

## 2019-12-13 DIAGNOSIS — K25.9 GASTRIC ULCER, UNSPECIFIED AS ACUTE OR CHRONIC, WITHOUT HEMORRHAGE OR PERFORATION: ICD-10-CM

## 2019-12-13 DIAGNOSIS — F41.9 ANXIETY DISORDER, UNSPECIFIED: ICD-10-CM

## 2019-12-13 DIAGNOSIS — I25.110 ATHEROSCLEROTIC HEART DISEASE OF NATIVE CORONARY ARTERY WITH UNSTABLE ANGINA PECTORIS: ICD-10-CM

## 2019-12-13 DIAGNOSIS — G24.8 OTHER DYSTONIA: ICD-10-CM

## 2019-12-13 DIAGNOSIS — Z82.3 FAMILY HISTORY OF STROKE: ICD-10-CM

## 2019-12-13 DIAGNOSIS — Z82.49 FAMILY HISTORY OF ISCHEMIC HEART DISEASE AND OTHER DISEASES OF THE CIRCULATORY SYSTEM: ICD-10-CM

## 2019-12-13 DIAGNOSIS — I34.1 NONRHEUMATIC MITRAL (VALVE) PROLAPSE: ICD-10-CM

## 2019-12-13 DIAGNOSIS — G89.29 OTHER CHRONIC PAIN: ICD-10-CM

## 2019-12-23 ENCOUNTER — APPOINTMENT (OUTPATIENT)
Dept: CARDIOLOGY | Facility: CLINIC | Age: 59
End: 2019-12-23
Payer: COMMERCIAL

## 2019-12-23 PROCEDURE — 99214 OFFICE O/P EST MOD 30 MIN: CPT

## 2019-12-23 PROCEDURE — 93000 ELECTROCARDIOGRAM COMPLETE: CPT

## 2020-01-08 ENCOUNTER — APPOINTMENT (OUTPATIENT)
Dept: INFECTIOUS DISEASE | Facility: CLINIC | Age: 60
End: 2020-01-08
Payer: SELF-PAY

## 2020-01-08 DIAGNOSIS — Z71.89 OTHER SPECIFIED COUNSELING: ICD-10-CM

## 2020-01-08 PROCEDURE — 90471 IMMUNIZATION ADMIN: CPT | Mod: NC

## 2020-01-08 PROCEDURE — 90472 IMMUNIZATION ADMIN EACH ADD: CPT | Mod: NC

## 2020-01-08 PROCEDURE — 90632 HEPA VACCINE ADULT IM: CPT

## 2020-01-08 PROCEDURE — 90691 TYPHOID VACCINE IM: CPT

## 2020-01-08 PROCEDURE — 99401 PREV MED CNSL INDIV APPRX 15: CPT | Mod: 25

## 2020-01-21 ENCOUNTER — OUTPATIENT (OUTPATIENT)
Dept: OUTPATIENT SERVICES | Facility: HOSPITAL | Age: 60
LOS: 1 days | Discharge: HOME | End: 2020-01-21

## 2020-01-21 DIAGNOSIS — Z98.890 OTHER SPECIFIED POSTPROCEDURAL STATES: Chronic | ICD-10-CM

## 2020-01-21 DIAGNOSIS — Z95.5 PRESENCE OF CORONARY ANGIOPLASTY IMPLANT AND GRAFT: ICD-10-CM

## 2020-01-22 ENCOUNTER — LABORATORY RESULT (OUTPATIENT)
Age: 60
End: 2020-01-22

## 2020-01-22 DIAGNOSIS — Z95.5 PRESENCE OF CORONARY ANGIOPLASTY IMPLANT AND GRAFT: ICD-10-CM

## 2020-01-27 ENCOUNTER — APPOINTMENT (OUTPATIENT)
Dept: CARDIOLOGY | Facility: CLINIC | Age: 60
End: 2020-01-27
Payer: COMMERCIAL

## 2020-01-27 PROCEDURE — 93000 ELECTROCARDIOGRAM COMPLETE: CPT

## 2020-01-27 PROCEDURE — 99214 OFFICE O/P EST MOD 30 MIN: CPT

## 2020-01-29 DIAGNOSIS — Z95.5 PRESENCE OF CORONARY ANGIOPLASTY IMPLANT AND GRAFT: ICD-10-CM

## 2020-04-25 ENCOUNTER — MESSAGE (OUTPATIENT)
Age: 60
End: 2020-04-25

## 2020-04-29 ENCOUNTER — APPOINTMENT (OUTPATIENT)
Dept: CARDIOLOGY | Facility: CLINIC | Age: 60
End: 2020-04-29
Payer: COMMERCIAL

## 2020-04-29 PROCEDURE — 99213 OFFICE O/P EST LOW 20 MIN: CPT | Mod: 95

## 2020-06-09 ENCOUNTER — LABORATORY RESULT (OUTPATIENT)
Age: 60
End: 2020-06-09

## 2020-06-10 LAB
SARS-COV-2 IGG SERPL IA-ACNC: 0.1 INDEX
SARS-COV-2 IGG SERPL QL IA: NEGATIVE

## 2020-07-01 ENCOUNTER — APPOINTMENT (OUTPATIENT)
Dept: CARDIOLOGY | Facility: CLINIC | Age: 60
End: 2020-07-01
Payer: COMMERCIAL

## 2020-07-01 ENCOUNTER — APPOINTMENT (OUTPATIENT)
Dept: CARDIOLOGY | Facility: CLINIC | Age: 60
End: 2020-07-01

## 2020-07-01 PROCEDURE — 99213 OFFICE O/P EST LOW 20 MIN: CPT | Mod: 95

## 2020-07-08 ENCOUNTER — APPOINTMENT (OUTPATIENT)
Dept: CARDIOLOGY | Facility: CLINIC | Age: 60
End: 2020-07-08

## 2020-07-29 ENCOUNTER — RECORD ABSTRACTING (OUTPATIENT)
Age: 60
End: 2020-07-29

## 2020-07-29 DIAGNOSIS — Z86.79 PERSONAL HISTORY OF OTHER DISEASES OF THE CIRCULATORY SYSTEM: ICD-10-CM

## 2020-08-12 ENCOUNTER — APPOINTMENT (OUTPATIENT)
Dept: CARDIOLOGY | Facility: CLINIC | Age: 60
End: 2020-08-12
Payer: COMMERCIAL

## 2020-08-12 ENCOUNTER — APPOINTMENT (OUTPATIENT)
Dept: CARDIOLOGY | Facility: CLINIC | Age: 60
End: 2020-08-12

## 2020-08-12 NOTE — DISCUSSION/SUMMARY
[FreeTextEntry1] : HTN(-) stress echo 10/2016\par 1+ TR\par mild ant prolapse of ant leaflet trace MR\par stress echo 2018 (-) Mild MVP\par S/p cath 12/2019 and 4.5x28 RADHA stent of RCA and 40% mid [facility] med tx\par will keep plavix for 1 year unless issues with it after discusssion with pt and maintain gi protection \par Lab Investigations	LDL 56 but muscle aches will drop to 10 \par going to rehab resolving no others stiil within 6 mos of RAHDA will continue but will check platlets which are normal will continue plavix for now\par pt muscle aches LDL of 71 on 10 , going to stp meds see if feels better or not and reconsider repatha although not clear wants to go this route now\par face time tele health today

## 2020-08-17 ENCOUNTER — APPOINTMENT (OUTPATIENT)
Dept: CARDIOLOGY | Facility: CLINIC | Age: 60
End: 2020-08-17
Payer: COMMERCIAL

## 2020-08-17 PROCEDURE — 99441: CPT

## 2020-08-17 NOTE — ASSESSMENT
[FreeTextEntry1] : Telehealth visit today \par HTN\par 1+ TR\par mild ant prolapse of ant leaflet MV; trace MR\par stress echo 2018 (-) Mild MVP\par S/p cath 12/2019 and 4.5x28 RADHA stent of RCA and 40% mid [facility] med tx\par will keep plavix for 1 year unless issues with it after discusssion with pt and maintain gi protection \par Lab Investigations	LDL 56 but muscle aches will drop to 10 a day and repat labs\par going to rehab resolving no others stiil within 6 mos of RADHA will continue but will check platlets which are normal will continue plavix for now\par pt muscle aches LDL of 71 on 10 , going to stop meds see if feels better or not and reconsider repatha although not clear wants to go this route now\par face time tele health today\par pt better off statin will repat labs and consider repatha

## 2020-10-05 ENCOUNTER — TRANSCRIPTION ENCOUNTER (OUTPATIENT)
Age: 60
End: 2020-10-05

## 2020-10-07 ENCOUNTER — APPOINTMENT (OUTPATIENT)
Dept: CARDIOLOGY | Facility: CLINIC | Age: 60
End: 2020-10-07
Payer: COMMERCIAL

## 2020-10-07 PROCEDURE — 99212 OFFICE O/P EST SF 10 MIN: CPT | Mod: 95

## 2020-10-07 NOTE — ASSESSMENT
[FreeTextEntry1] : HTN(-) stress echo 10/2016\par 1+ TR\par mild ant prolapse of ant leaflet trace MR\par ASCVD 12% past issues with statins past\par stress echo 2018 (-) Mild MVP\par S/p cath 12/2019 and 4.5x28 RADHA stent of RCA and 40% mid [facility] med tx\par will keep plavix for 1 year unless issues with it after discusssion with pt and maintain gi protection \par LDL 56 but muscle aches will drop to 10 a dya and r\par going to rehab resolving no others stiil within 6 mos of RADHA will continue but will check platlets which are normal will continue plavix for now\par pt muscle aches LDL of 71 on 10 , going to stp meds see if feels better or not and reconsider repatha although not clear wants to go this route now  off statins and muscle spasm completely resolved\par pt will see me in Dec if EKG good and no symptoms will d/c plavix and start repatha  (pt prefrs to wait since concerned about muscle spasms and limited meds availble while on plavix and wife going to chemo for breast \par face time [facility] today\par

## 2020-10-07 NOTE — PHYSICAL EXAM
[General Appearance - Well Developed] : well developed [Normal Appearance] : normal appearance [Well Groomed] : well groomed [General Appearance - Well Nourished] : well nourished [No Deformities] : no deformities [General Appearance - In No Acute Distress] : no acute distress [Normal Conjunctiva] : the conjunctiva exhibited no abnormalities [Eyelids - No Xanthelasma] : the eyelids demonstrated no xanthelasmas [Normal Oral Mucosa] : normal oral mucosa [No Oral Pallor] : no oral pallor [No Oral Cyanosis] : no oral cyanosis [] : no respiratory distress [Auscultation Breath Sounds / Voice Sounds] : lungs were clear to auscultation bilaterally [Heart Rate And Rhythm] : heart rate and rhythm were normal [Heart Sounds] : normal S1 and S2 [Murmurs] : no murmurs present [Arterial Pulses Normal] : the arterial pulses were normal [Edema] : no peripheral edema present [Bowel Sounds] : normal bowel sounds [Abdomen Soft] : soft [Abdomen Mass (___ Cm)] : no abdominal mass palpated [Nail Clubbing] : no clubbing of the fingernails [Cyanosis, Localized] : no localized cyanosis [Oriented To Time, Place, And Person] : oriented to person, place, and time [FreeTextEntry1] : No JVD

## 2020-12-15 PROBLEM — Z87.09 HISTORY OF SORE THROAT: Status: RESOLVED | Noted: 2017-11-08 | Resolved: 2020-12-15

## 2021-01-06 ENCOUNTER — RX RENEWAL (OUTPATIENT)
Age: 61
End: 2021-01-06

## 2021-01-13 ENCOUNTER — APPOINTMENT (OUTPATIENT)
Dept: CARDIOLOGY | Facility: CLINIC | Age: 61
End: 2021-01-13
Payer: COMMERCIAL

## 2021-01-13 PROCEDURE — 99441: CPT

## 2021-01-13 NOTE — PHYSICAL EXAM
[General Appearance - Well Developed] : well developed [Normal Appearance] : normal appearance [Well Groomed] : well groomed [General Appearance - Well Nourished] : well nourished [No Deformities] : no deformities [General Appearance - In No Acute Distress] : no acute distress [Normal Conjunctiva] : the conjunctiva exhibited no abnormalities [Eyelids - No Xanthelasma] : the eyelids demonstrated no xanthelasmas [Normal Oral Mucosa] : normal oral mucosa [No Oral Pallor] : no oral pallor [No Oral Cyanosis] : no oral cyanosis [] : no respiratory distress [Auscultation Breath Sounds / Voice Sounds] : lungs were clear to auscultation bilaterally [Heart Rate And Rhythm] : heart rate and rhythm were normal [Heart Sounds] : normal S1 and S2 [Murmurs] : no murmurs present [Arterial Pulses Normal] : the arterial pulses were normal [Edema] : no peripheral edema present [Abdomen Soft] : soft [Bowel Sounds] : normal bowel sounds [Abdomen Mass (___ Cm)] : no abdominal mass palpated [Nail Clubbing] : no clubbing of the fingernails [Cyanosis, Localized] : no localized cyanosis [Oriented To Time, Place, And Person] : oriented to person, place, and time [FreeTextEntry1] : No JVD

## 2021-01-13 NOTE — ASSESSMENT
[FreeTextEntry1] : HTN(-) stress echo 10/2016\par 1+ TR\par mild ant prolapse of ant leaflet trace MR\par ASCVD 12% past issues with statins past\par stress echo 2018 (-) Mild MVP\par S/p cath 12/2019 and 4.5x28 RADHA stent of RCA and 40% mid [facility] med tx\par will keep plavix for 1 year unless issues with it after discusssion with pt and maintain gi protection \par LDL 56 but muscle aches will drop to 10 a dya and r\par going to rehab resolving no others stiil within 6 mos of RADHA will continue but will check platlets which are normal will continue plavix for now\par pt muscle aches LDL of 71 on 10 , going to stop meds see if feels better or not and reconsider repatha although not clear wants to go this route now  off statins and muscle spasm completely resolved\par pt will see me in Dec\par  if EKG good and no symptoms will d/c plavix and start repatha  (pt prefrs to wait since concerned about muscle spasms and limited meds availble while on plavix and wife going to chemo for breast \par phone  [facility] today\par 1. pt going to d/c plavix discussed maybe be better to sop ASA fpr GI protection but prefers this \par 2. pt d/shani norvasc but BP seems stable so no change\par 3. pt not ready to consider repatha until gets 2nd opinion about back

## 2021-01-21 ENCOUNTER — RX RENEWAL (OUTPATIENT)
Age: 61
End: 2021-01-21

## 2021-03-15 ENCOUNTER — RX RENEWAL (OUTPATIENT)
Age: 61
End: 2021-03-15

## 2021-08-19 ENCOUNTER — OUTPATIENT (OUTPATIENT)
Dept: OUTPATIENT SERVICES | Facility: HOSPITAL | Age: 61
LOS: 1 days | Discharge: HOME | End: 2021-08-19
Payer: COMMERCIAL

## 2021-08-19 DIAGNOSIS — Z98.890 OTHER SPECIFIED POSTPROCEDURAL STATES: Chronic | ICD-10-CM

## 2021-08-19 DIAGNOSIS — M54.16 RADICULOPATHY, LUMBAR REGION: ICD-10-CM

## 2021-08-19 PROCEDURE — 72148 MRI LUMBAR SPINE W/O DYE: CPT | Mod: 26

## 2021-10-13 ENCOUNTER — APPOINTMENT (OUTPATIENT)
Dept: CARDIOLOGY | Facility: CLINIC | Age: 61
End: 2021-10-13
Payer: COMMERCIAL

## 2021-10-13 VITALS
TEMPERATURE: 97.6 F | HEART RATE: 65 BPM | WEIGHT: 170 LBS | SYSTOLIC BLOOD PRESSURE: 130 MMHG | DIASTOLIC BLOOD PRESSURE: 90 MMHG | BODY MASS INDEX: 26.68 KG/M2 | OXYGEN SATURATION: 98 % | HEIGHT: 67 IN

## 2021-10-13 DIAGNOSIS — I34.0 NONRHEUMATIC MITRAL (VALVE) INSUFFICIENCY: ICD-10-CM

## 2021-10-13 PROCEDURE — 93000 ELECTROCARDIOGRAM COMPLETE: CPT

## 2021-10-13 PROCEDURE — 99213 OFFICE O/P EST LOW 20 MIN: CPT

## 2021-10-13 RX ORDER — ROSUVASTATIN CALCIUM 10 MG/1
10 TABLET, FILM COATED ORAL
Qty: 90 | Refills: 3 | Status: DISCONTINUED | COMMUNITY
Start: 2021-01-06 | End: 2021-10-13

## 2021-10-13 RX ORDER — CLOPIDOGREL BISULFATE 75 MG/1
75 TABLET, FILM COATED ORAL DAILY
Refills: 0 | Status: DISCONTINUED | COMMUNITY
End: 2021-10-13

## 2021-10-13 RX ORDER — ATOVAQUONE AND PROGUANIL HYDROCHLORIDE 250; 100 MG/1; MG/1
250-100 TABLET, FILM COATED ORAL DAILY
Qty: 15 | Refills: 0 | Status: DISCONTINUED | COMMUNITY
Start: 2020-01-08 | End: 2021-10-13

## 2021-10-13 RX ORDER — ROSUVASTATIN CALCIUM 10 MG/1
10 TABLET, FILM COATED ORAL
Qty: 90 | Refills: 1 | Status: DISCONTINUED | COMMUNITY
End: 2021-10-13

## 2021-10-13 NOTE — ASSESSMENT
[FreeTextEntry1] : HTN(-) stress echo 10/2016\par 1+ TR\par mild ant prolapse of ant leaflet trace MR\par ASCVD 12% past issues with statins past\par stress echo 2018 (-) Mild MVP\par S/p cath 12/2019 and 4.5x28 RADHA stent of RCA and 40% mid [facility] med tx\par going to rehab resolving no others stiil within 6 mos of RADHA will continue but will check platlets which are normal will continue plavix for now\par pt muscle aches LDL of 71 on 10 , going to stop meds see if feels better or not and reconsider repatha although not clear wants to go this route now  off statins and muscle spasm completely resolved\par pt will see me in Dec, will add zetia 10 mg a day since  \par pt walks 3.5 miles per day gets occasioal chest senstion will order stress echo \par   start repatha  (pt prefrs to wait since concerned about muscle spasms and limited meds availble while on plavix and wife going to chemo for breast )\par \par 1. pt going to d/c plavix discussed maybe be better to sop ASA fpr GI protection but prefers this \par 2. due to occas chest sensation stress echo \par 3 add zetia 10 \par 4 add back  amlodipine 2.5 a day\par 5 lipids 6 weeks

## 2021-10-13 NOTE — PHYSICAL EXAM
[General Appearance - Well Developed] : well developed [Normal Appearance] : normal appearance [Well Groomed] : well groomed [General Appearance - Well Nourished] : well nourished [No Deformities] : no deformities [General Appearance - In No Acute Distress] : no acute distress [Normal Conjunctiva] : the conjunctiva exhibited no abnormalities [Eyelids - No Xanthelasma] : the eyelids demonstrated no xanthelasmas [Normal Oral Mucosa] : normal oral mucosa [No Oral Pallor] : no oral pallor [No Oral Cyanosis] : no oral cyanosis [] : no respiratory distress [Auscultation Breath Sounds / Voice Sounds] : lungs were clear to auscultation bilaterally [Heart Sounds] : normal S1 and S2 [Heart Rate And Rhythm] : heart rate and rhythm were normal [Murmurs] : no murmurs present [Arterial Pulses Normal] : the arterial pulses were normal [Edema] : no peripheral edema present [Bowel Sounds] : normal bowel sounds [Abdomen Soft] : soft [Abdomen Mass (___ Cm)] : no abdominal mass palpated [Nail Clubbing] : no clubbing of the fingernails [Cyanosis, Localized] : no localized cyanosis [Oriented To Time, Place, And Person] : oriented to person, place, and time [FreeTextEntry1] : No JVD

## 2021-11-01 ENCOUNTER — APPOINTMENT (OUTPATIENT)
Dept: CARDIOLOGY | Facility: CLINIC | Age: 61
End: 2021-11-01
Payer: COMMERCIAL

## 2021-11-01 DIAGNOSIS — R07.9 CHEST PAIN, UNSPECIFIED: ICD-10-CM

## 2021-11-01 PROCEDURE — 93351 STRESS TTE COMPLETE: CPT

## 2021-11-01 PROCEDURE — 93325 DOPPLER ECHO COLOR FLOW MAPG: CPT

## 2021-11-01 PROCEDURE — 93320 DOPPLER ECHO COMPLETE: CPT

## 2022-01-05 ENCOUNTER — APPOINTMENT (OUTPATIENT)
Dept: NEUROLOGY | Facility: CLINIC | Age: 62
End: 2022-01-05
Payer: COMMERCIAL

## 2022-01-05 PROCEDURE — 99204 OFFICE O/P NEW MOD 45 MIN: CPT | Mod: 95

## 2022-01-05 NOTE — HISTORY OF PRESENT ILLNESS
[FreeTextEntry1] : Gold Jerez is a 61 year old M with a PMHx of CAD s/p stent in December 2019, HTN, HLD, mild mitral regurgitation, chronic lower back pain and focal dystonia (writer's cramp).\par He presents for initial visit in the Movement Disorders Clinic at [facility]. He was referred for focal dystonia evaluation by Dr. Najjar as his Neurologist has retired, Dr. Franchesca Lu, affiliated with [facility].\par \par His focal dystonia has been relatively well controlled. It started about 28 years ago. \par He was writing one day, his right hand involuntary jumps/jerking into the air. He has not had a relapse since treatment. He tolerates the medications well. He was told that discontinuing the medication, if he had a relapse he would need a higher dosage and prone to side effects as a result. Botox was not recommended because he was told that botox would cause loss of muscle tone overtime. \par He usually types for work now. His handwriting is worse over the years and difficult to read now. If he does write a lot, his hand does stiffen up. \par No interruption with his activities of daily living. \par \par Current meds:\par Artane 1mg every other day\par Clonazepam 0.5mg BID\par Enalapril\par Amlodipine\par Aspirin\par Pantoprazole\par Ezetimibe \par \par Prior meds:\par Statins - joint pain and back spasms\par \par He has had a few EMGs with no significant findings. \par Memory testing previously normal. \par He did have neuroimaging when he was first diagnosed.\par \par Family history: unremarkable, no dystonia\par Social history:  for [employer] x 16 years

## 2022-01-05 NOTE — REASON FOR VISIT
[Initial Eval - Existing Diagnosis] : an initial evaluation of an existing diagnosis [Home] : at home, [unfilled] , at the time of the visit. [Medical Office: ([facility])___] : at the medical office located in  [Verbal consent obtained from patient] : the patient, [unfilled]

## 2022-01-05 NOTE — DISCUSSION/SUMMARY
[FreeTextEntry1] : Gold Jerez is a 61 year old M with a PMHx of CAD s/p stent in December 2019, HTN, HLD, mild mitral regurgitation, chronic lower back pain and focal dystonia (writer's cramp).\par He presents for initial visit in the Movement Disorders Clinic at [facility]. He was referred for focal dystonia evaluation by Dr. Najjar as his Neurologist has retired, Dr. Franchesca Lu, affiliated with [facility]\par \par The patient's focal dystonia is currently under control. We did discuss the option of botox if he becomes interested. At this time, will continue the current course of treatment with Artane and Clonazepam. We also discussed potentially decreasing these medications to evaluate for improvement in his cognition. He will think about the above options that we discussed and let me know. We discussed prognosis and disease manifestations.\par \par RTC 4 months\par \par All questions were answered to the best of my knowledge. I spent 45 minutes with this patient.

## 2022-01-05 NOTE — PHYSICAL EXAM
[Person] : oriented to person [Place] : oriented to place [Time] : oriented to time [Concentration Intact] : normal concentrating ability [Comprehension] : comprehension intact [Cranial Nerves Oculomotor (III)] : extraocular motion intact [Cranial Nerves Facial (VII)] : face symmetrical [Cranial Nerves Vestibulocochlear (VIII)] : hearing was intact bilaterally [Cranial Nerves Accessory (XI - Cranial And Spinal)] : head turning and shoulder shrug symmetric [Cranial Nerves Hypoglossal (XII)] : there was no tongue deviation with protrusion [Motor Strength] : muscle strength was normal in all four extremities [Involuntary Movements] : no involuntary movements were seen [Paresis Pronator Drift Right-Sided] : no pronator drift on the right [Paresis Pronator Drift Left-Sided] : no pronator drift on the left [Abnormal Walk] : normal gait [Coordination - Dysmetria Impaired Finger-to-Nose Bilateral] : not present [FreeTextEntry1] : No bradykinesia, or tremors noted. No apparent rigidity on examination. \par \par With repeated writing, there is mild hyperextension of the right wrist. \par Spiral drawing was normal bilaterally.

## 2022-01-12 ENCOUNTER — RX RENEWAL (OUTPATIENT)
Age: 62
End: 2022-01-12

## 2022-03-30 ENCOUNTER — APPOINTMENT (OUTPATIENT)
Dept: CARDIOLOGY | Facility: CLINIC | Age: 62
End: 2022-03-30
Payer: COMMERCIAL

## 2022-03-30 ENCOUNTER — NON-APPOINTMENT (OUTPATIENT)
Age: 62
End: 2022-03-30

## 2022-03-30 VITALS
HEIGHT: 67 IN | BODY MASS INDEX: 27.97 KG/M2 | OXYGEN SATURATION: 97 % | HEART RATE: 70 BPM | WEIGHT: 178.2 LBS | TEMPERATURE: 97.9 F | SYSTOLIC BLOOD PRESSURE: 134 MMHG | DIASTOLIC BLOOD PRESSURE: 82 MMHG

## 2022-03-30 PROCEDURE — 99213 OFFICE O/P EST LOW 20 MIN: CPT

## 2022-03-30 PROCEDURE — 93000 ELECTROCARDIOGRAM COMPLETE: CPT

## 2022-03-30 RX ORDER — CHLORHEXIDINE GLUCONATE, 0.12% ORAL RINSE 1.2 MG/ML
0.12 SOLUTION DENTAL
Qty: 1 | Refills: 2 | Status: DISCONTINUED | COMMUNITY
Start: 2017-11-08 | End: 2022-03-30

## 2022-03-30 RX ORDER — ASPIRIN 81 MG
81 TABLET, DELAYED RELEASE (ENTERIC COATED) ORAL DAILY
Refills: 0 | Status: DISCONTINUED | COMMUNITY
End: 2022-03-30

## 2022-03-30 RX ORDER — AZITHROMYCIN 500 MG/1
500 TABLET, FILM COATED ORAL DAILY
Qty: 5 | Refills: 0 | Status: DISCONTINUED | COMMUNITY
Start: 2020-01-08 | End: 2022-03-30

## 2022-03-30 NOTE — ASSESSMENT
[FreeTextEntry1] : Assessment:\par #Chest discomfort\par - Atypical, sounds like GERD\par - EKG 3/30/22NSR 70 bpm, no ischemia\par - Stress echo 11/2022: 10.3 mets, normal exercise stress test, echo negative for ischemia\par - 11/1/21 TTE nl LV sys function, LVEF 59%, mild MR\par #CAD s/p RCA PCI 2019\par - also with 40% mLAD\par #HTN - controlled\par #DLD - not at goal\par - statin intolerant\par - not at goal on Zetia alone\par - 3/24/22 , TG 96, HDL 46, \par \par Plan:\par - Discussed PCSK9i, patient would like to read about them some more first before trying one\par - Continue Zetia, ASA 81 mg daily \par - Continue enalapril 20 mg BID and amlodipine 2.5 mg daily for BP\par - Dietician referral to help with DLD\par - Return to clinic in 3 months to further discuss PCSK9i, coleselevam, bempedoic acid, inclisiran \par

## 2022-03-30 NOTE — HISTORY OF PRESENT ILLNESS
[FreeTextEntry1] : 62M with CAD s/p PCI RCA, mLAD 40% 12/2019, DLD, HTN here for follow-up. \par \par Burning sensation in chest. Lasts 10-15 minutes. Occurs at rest or when supine. Improves with water. Not exacerbated by exertion. \par \par Zetia 10 mg daily - tolerating \par Joints ache on statins, debilitating back spasms on both Crestor and Lipitor. Hesitant to try PCSK9i, concerned about side effects. \par \par Checks BP on weekends -150/80, 120-135/70s. Compliant with enalapril 20 mg BID and amlodipine 2.5. \par \par TTE 11/1/2021 nl LVEF 59%, mild MR\par \par Exercise: 10K steps per day, yoga 3x/week, stretching\par \par Never used tobacco\par \par Father: SCD 57, attributed to MI; smoker\par Mother: HTN, CVA

## 2022-04-01 ENCOUNTER — NON-APPOINTMENT (OUTPATIENT)
Age: 62
End: 2022-04-01

## 2022-05-25 ENCOUNTER — NON-APPOINTMENT (OUTPATIENT)
Age: 62
End: 2022-05-25

## 2022-05-25 ENCOUNTER — APPOINTMENT (OUTPATIENT)
Dept: NEUROLOGY | Facility: CLINIC | Age: 62
End: 2022-05-25
Payer: COMMERCIAL

## 2022-05-25 DIAGNOSIS — R41.3 OTHER AMNESIA: ICD-10-CM

## 2022-05-25 PROCEDURE — 99215 OFFICE O/P EST HI 40 MIN: CPT

## 2022-05-25 NOTE — DISCUSSION/SUMMARY
[FreeTextEntry1] : Gold Jerez is a 62 year old M with a PMHx of CAD s/p stent in December 2019, HTN, HLD, mild mitral regurgitation, chronic lower back pain and focal dystonia (writer's cramp).\par He presents for follow up in the Movement Disorders Clinic at [facility]. He was referred for focal dystonia evaluation by Dr. Najjar as his Neurologist has retired, Dr. Franchesca Lu, affiliated with [facility].\par \par The patient's focal dystonia is currently under control. At this time, will continue the current course of treatment with Artane and Clonazepam. We also discussed potentially decreasing these medications to evaluate for improvement in his cognition as clonazepam can affect memory. \par MoCA score was 26/30, which is normal. However, given his concerns for worsening short term memory and recall, will obtain further investigations.\par \par Plan:\par - Continue Artane and Clonazepam as prescribed\par - Labs: B12, folate, TSH, B1\par - MRI brain without contrast. Will call with results.\par \par RTC 4 months\par \par All questions were answered to the best of my knowledge. I spent 40 minutes with this patient.

## 2022-05-25 NOTE — HISTORY OF PRESENT ILLNESS
[FreeTextEntry1] : Gold Jerez is a 62 year old M with a PMHx of CAD s/p stent in December 2019, HTN, HLD, mild mitral regurgitation, chronic lower back pain and focal dystonia (writer's cramp).\par He presents for follow up in the Movement Disorders Clinic at [facility]. He was referred for focal dystonia evaluation by Dr. Najjar as his Neurologist has retired, Dr. Franchesca Lu, affiliated with [facility].\par \par Interval history:\par Since the last visit, he is doing well. He states that he does not write much but can feel the pulling in the wrist as he continues to write. If he is typing a lot he may feel the pulling. He has a wrist pain. He is concerned about his memory. He does sometimes forget the names of people he should know, but eventually remembers the name. He has some word finding difficulties.\par \par Current meds:\par Artane 1mg every other day\par Clonazepam 0.5mg BID\par Enalapril\par Amlodipine\par Aspirin\par Pantoprazole\par Ezetimibe \par \par Prior meds:\par Statins - joint pain and back spasms\par \par Initial history:\par His focal dystonia has been relatively well controlled. It started about 28 years ago. \par He was writing one day, his right hand involuntary jumps/jerking into the air. He has not had a relapse since treatment. He tolerates the medications well. He was told that discontinuing the medication, if he had a relapse he would need a higher dosage and prone to side effects as a result. Botox was not recommended because he was told that botox would cause loss of muscle tone overtime. \par He usually types for work now. His handwriting is worse over the years and difficult to read now. If he does write a lot, his hand does stiffen up. \par No interruption with his activities of daily living. \par \par He has had a few EMGs with no significant findings. \par Memory testing previously normal. \par He did have neuroimaging when he was first diagnosed.\par \par Family history: unremarkable, no dystonia\par Social history:  for [employer] x 16 years

## 2022-05-25 NOTE — PHYSICAL EXAM
[Person] : oriented to person [Place] : oriented to place [Time] : oriented to time [Concentration Intact] : normal concentrating ability [Comprehension] : comprehension intact [Cranial Nerves Oculomotor (III)] : extraocular motion intact [Cranial Nerves Facial (VII)] : face symmetrical [Cranial Nerves Vestibulocochlear (VIII)] : hearing was intact bilaterally [Cranial Nerves Accessory (XI - Cranial And Spinal)] : head turning and shoulder shrug symmetric [Cranial Nerves Hypoglossal (XII)] : there was no tongue deviation with protrusion [Motor Strength] : muscle strength was normal in all four extremities [Involuntary Movements] : no involuntary movements were seen [Abnormal Walk] : normal gait [Cranial Nerves Trigeminal (V)] : facial sensation intact symmetrically [Cranial Nerves Glossopharyngeal (IX)] : tongue and palate midline [1+] : Ankle jerk left 1+ [Paresis Pronator Drift Right-Sided] : no pronator drift on the right [Paresis Pronator Drift Left-Sided] : no pronator drift on the left [Coordination - Dysmetria Impaired Finger-to-Nose Bilateral] : not present [FreeTextEntry4] : [place] 26/30 [FreeTextEntry1] : Mild difficulty with rapid sequential movements, right more than left. Normal finger tapping and rapid alternating movements.  \par Mildly impaired toe tapping on the left.\par No rigidity.\par Normal leg agility\par No trouble standing with arms crossed. Gait is normal aside from mildly decreased right arm swing.\par \par Writing is impaired, but positioning of hand is not abnormal. Slight hyperextension of the wrist as writing continues, not likely clinically significant.\par Spiral drawing was normal bilaterally.

## 2022-06-07 ENCOUNTER — RESULT REVIEW (OUTPATIENT)
Age: 62
End: 2022-06-07

## 2022-06-07 ENCOUNTER — OUTPATIENT (OUTPATIENT)
Dept: OUTPATIENT SERVICES | Facility: HOSPITAL | Age: 62
LOS: 1 days | Discharge: HOME | End: 2022-06-07
Payer: COMMERCIAL

## 2022-06-07 DIAGNOSIS — R41.3 OTHER AMNESIA: ICD-10-CM

## 2022-06-07 DIAGNOSIS — Z98.890 OTHER SPECIFIED POSTPROCEDURAL STATES: Chronic | ICD-10-CM

## 2022-06-07 PROCEDURE — 70551 MRI BRAIN STEM W/O DYE: CPT | Mod: 26

## 2022-06-08 LAB
FOLATE SERPL-MCNC: >20 NG/ML
TSH SERPL-ACNC: 1.72 UIU/ML
VIT B12 SERPL-MCNC: 507 PG/ML

## 2022-06-22 LAB — VIT B1 SERPL-MCNC: 148 NMOL/L

## 2022-06-27 ENCOUNTER — APPOINTMENT (OUTPATIENT)
Dept: CARDIOLOGY | Facility: CLINIC | Age: 62
End: 2022-06-27
Payer: COMMERCIAL

## 2022-06-27 VITALS
TEMPERATURE: 97.3 F | SYSTOLIC BLOOD PRESSURE: 122 MMHG | OXYGEN SATURATION: 96 % | WEIGHT: 179 LBS | HEART RATE: 54 BPM | HEIGHT: 67 IN | DIASTOLIC BLOOD PRESSURE: 78 MMHG | BODY MASS INDEX: 28.09 KG/M2

## 2022-06-27 PROCEDURE — 99213 OFFICE O/P EST LOW 20 MIN: CPT

## 2022-06-27 PROCEDURE — 93000 ELECTROCARDIOGRAM COMPLETE: CPT

## 2022-06-28 NOTE — PHYSICAL EXAM
[Well Developed] : well developed [Well Nourished] : well nourished [No Acute Distress] : no acute distress [Normal Conjunctiva] : normal conjunctiva [Normal Venous Pressure] : normal venous pressure [No Carotid Bruit] : no carotid bruit [Normal S1, S2] : normal S1, S2 [No Murmur] : no murmur [No Rub] : no rub [No Gallop] : no gallop [Clear Lung Fields] : clear lung fields [Good Air Entry] : good air entry [Soft] : abdomen soft [Non Tender] : non-tender [Moves all extremities] : moves all extremities [No Focal Deficits] : no focal deficits [Normal Speech] : normal speech [No ulcers] : no ulcers [No edema] : no edema

## 2022-06-28 NOTE — HISTORY OF PRESENT ILLNESS
[FreeTextEntry1] : 62M with CAD s/p PCI RCA, mLAD 40% 12/2019, DLD, HTN here for follow-up to discuss treatment of his dyslipidemia.   \par \par 6/27/22\par Overall feels tired. No chest pain, shortness of breath, palpitations, lightheadedness/dizziness, pre-syncope/syncope, or lower extremity edema. Walked 10K steps yersteday. Exercise includes walking, stretching, yoga, hikes, swimming. No claudication. \par \par He reviewed safety profiles and insurance coverage of bempedoic acid, Welchol, PCSK9 inhibitors, inclisiran. He does not want to try Repatha given his history of back pain. No injectables due to vasovagal syncope with needles. \par \par Checks BP on weekends. Compliant with medications. \par \par AM: 150-153/84\par /78\par \par Enalapril 10 AM and 2230 PM\par Amlodipine at 10 AM\par \par \par 3/30/22\par Burning sensation in chest. Lasts 10-15 minutes. Occurs at rest or when supine. Improves with water. Not exacerbated by exertion. \par \par Zetia 10 mg daily - tolerating \par Joints ache on statins, debilitating back spasms on both Crestor and Lipitor. Hesitant to try PCSK9i, concerned about side effects. \par \par Checks BP on weekends -150/80, 120-135/70s. Compliant with enalapril 20 mg BID and amlodipine 2.5. \par \par TTE 11/1/2021 nl LVEF 59%, mild MR\par \par Exercise: 10K steps per day, yoga 3x/week, stretching\par \par Never used tobacco\par \par Father: SCD 57, attributed to MI; smoker\par Mother: HTN, CVA

## 2022-06-28 NOTE — REVIEW OF SYSTEMS
[Weight Gain (___ Lbs)] : no recent weight gain [Feeling Fatigued] : feeling fatigued [Dizziness] : no dizziness [Negative] : Cardiovascular [FreeTextEntry9] : back pain

## 2022-06-28 NOTE — CARDIOLOGY SUMMARY
[de-identified] : EKG 6/27/22 sinus bradycardia 54 bpm\par EKG 3/30/22 NSR 70 bpm, no ischemia [de-identified] : TTE 11/1/21 nl LV sys function, LVEF 59%, mild MR [de-identified] : Stress echo 11/2022: 10.3 mets, normal exercise stress test, echo negative for ischemia

## 2022-06-28 NOTE — ASSESSMENT
[FreeTextEntry1] : Assessment:\par #DLD - not at goal\par - statin intolerant\par - 3/24/22 , TG 96, HDL 46,  on ezetimibe 10 mg daily\par #CAD s/p RCA PCI 2019\par - also with 40% mLAD\par #HTN - controlled\par \par \par Plan:\par - Discussed PCSK9i, coleselevam, bempedoic acid, and inclisiran \par - Per our discussion, will start coleseleval (Welchol) 3.75 g daily; reviewed side effects, how/when to take\par - Repeat lipid profile and CMP now (patient having annual exam with PCP and will have her order the tests)\par - Repeat lipid profile 3 months from starting welchol 625 mg daily \par - Continue Zetia, ASA 81 mg daily \par - Continue enalapril 20 mg BID and amlodipine 2.5 mg daily for BP\par - Return to clinic in 3 months

## 2022-09-20 ENCOUNTER — OUTPATIENT (OUTPATIENT)
Dept: OUTPATIENT SERVICES | Facility: HOSPITAL | Age: 62
LOS: 1 days | Discharge: HOME | End: 2022-09-20

## 2022-09-20 DIAGNOSIS — R35.0 FREQUENCY OF MICTURITION: ICD-10-CM

## 2022-09-20 DIAGNOSIS — R10.2 PELVIC AND PERINEAL PAIN: ICD-10-CM

## 2022-09-20 DIAGNOSIS — Z98.890 OTHER SPECIFIED POSTPROCEDURAL STATES: Chronic | ICD-10-CM

## 2022-09-20 PROCEDURE — 76856 US EXAM PELVIC COMPLETE: CPT | Mod: 26

## 2022-10-08 ENCOUNTER — RX RENEWAL (OUTPATIENT)
Age: 62
End: 2022-10-08

## 2022-10-26 ENCOUNTER — APPOINTMENT (OUTPATIENT)
Dept: NEUROLOGY | Facility: CLINIC | Age: 62
End: 2022-10-26

## 2022-10-26 VITALS
WEIGHT: 179 LBS | OXYGEN SATURATION: 98 % | BODY MASS INDEX: 28.09 KG/M2 | DIASTOLIC BLOOD PRESSURE: 73 MMHG | HEART RATE: 59 BPM | SYSTOLIC BLOOD PRESSURE: 123 MMHG | HEIGHT: 67 IN | TEMPERATURE: 98 F

## 2022-10-26 PROCEDURE — 99214 OFFICE O/P EST MOD 30 MIN: CPT

## 2022-10-26 NOTE — PHYSICAL EXAM
[General Appearance - Alert] : alert [General Appearance - In No Acute Distress] : in no acute distress [General Appearance - Well-Appearing] : healthy appearing [Oriented To Time, Place, And Person] : oriented to person, place, and time [Person] : oriented to person [Place] : oriented to place [Time] : oriented to time [Sensation Tactile Decrease] : light touch was intact [Balance] : balance was intact [Neck Appearance] : the appearance of the neck was normal [] : no respiratory distress [Exaggerated Use Of Accessory Muscles For Inspiration] : no accessory muscle use [Edema] : there was no peripheral edema [Involuntary Movements] : no involuntary movements were seen [Abnormal Walk] : normal gait [Concentration Intact] : normal concentrating ability [Comprehension] : comprehension intact [Cranial Nerves Optic (II)] : visual acuity intact bilaterally,  visual fields full to confrontation, pupils equal round and reactive to light [Cranial Nerves Oculomotor (III)] : extraocular motion intact [Cranial Nerves Trigeminal (V)] : facial sensation intact symmetrically [Cranial Nerves Facial (VII)] : face symmetrical [Cranial Nerves Vestibulocochlear (VIII)] : hearing was intact bilaterally [Cranial Nerves Glossopharyngeal (IX)] : tongue and palate midline [Cranial Nerves Accessory (XI - Cranial And Spinal)] : head turning and shoulder shrug symmetric [Cranial Nerves Hypoglossal (XII)] : there was no tongue deviation with protrusion [Motor Strength] : muscle strength was normal in all four extremities [Paresis Pronator Drift Right-Sided] : no pronator drift on the right [Paresis Pronator Drift Left-Sided] : no pronator drift on the left [FreeTextEntry1] : No significant rigidity or tremors.\par Mild bradykinesia on the right with finger tapping and rapid sequential movements. \par Gait with slight decreased right arm swing, otherwise normal.\par Postural reflexes are intact.\par Handwriting was jerky, but no abnormal dystonic posturing while writing.  [FreeTextEntry8] : No abnormal posturing

## 2022-10-26 NOTE — ASSESSMENT
[FreeTextEntry1] : Gold Jerez is a 62 year old M with a PMHx of CAD s/p stent in December 2019, HTN, HLD, mild mitral regurgitation, chronic lower back pain and focal dystonia (writer's cramp).\par He presents for follow up in the Movement Disorders Clinic at [facility]. He was referred for focal dystonia evaluation by Dr. Najjar as his Neurologist has retired, Dr. Franchesca Lu, affiliated with [facility].\par \par On exam, patient found to have decreased fine motor skill on the right hand. Otherwise normal gait and balance. Exam appears stable from prior visits.\par \par \par Plan:\par - Continue Artane and Clonazepam as prescribed\par - Labs: B12, folate, TSH, B1 - Reviewed, all wnl\par - MRI brain without contrast - reviewed, unremarkable\par - will plan to repeat MOCA during next visit \par - will send refill clonazepam\par \par RTC 6 months\par

## 2022-10-26 NOTE — HISTORY OF PRESENT ILLNESS
[FreeTextEntry1] : Gold Jerez is a 62 year old M with a PMHx of CAD s/p stent in December 2019, HTN, HLD, mild mitral regurgitation, chronic lower back pain and focal dystonia (writer's cramp). He presents for follow up in the Movement Disorders Clinic at [facility]. He was referred for focal dystonia evaluation by Dr. Najjar as his Neurologist has retired, Dr. Franchesca Lu, affiliated with [facility].\par \par Interval history:\par Since last visit in June, patient has been feeling okay. He is able to type with no issues. He admits to having some wrist pain but otherwise no numbness or tingling. As for he memory, he feels its okay, it is not worst than before but still forgets names and numbers among others. Denies any side effects from the medications. Denies any trouble at work. He does have back spasms for which he does physical therapy and has been helping. 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 \par \par Current meds:\par Artane 1mg every other day\par Clonazepam 0.5mg BID\par Enalapril\par Amlodipine\par Aspirin\par Pantoprazole\par Ezetimibe \par \par Prior meds:\par Statins - joint pain and back spasms\par \par Initial history:\par His focal dystonia has been relatively well controlled. It started about 28 years ago. \par He was writing one day, his right hand involuntary jumps/jerking into the air. He has not had a relapse since treatment. He tolerates the medications well. He was told that discontinuing the medication, if he had a relapse he would need a higher dosage and prone to side effects as a result. Botox was not recommended because he was told that botox would cause loss of muscle tone overtime. \par He usually types for work now. His handwriting is worse over the years and difficult to read now. If he does write a lot, his hand does stiffen up. \par No interruption with his activities of daily living. \par \par He has had a few EMGs with no significant findings. \par Memory testing previously normal. \par He did have neuroimaging when he was first diagnosed.\par \par Family history: unremarkable, no dystonia\par Social history:  for [employer] x 16 years

## 2022-10-26 NOTE — DISCUSSION/SUMMARY
[FreeTextEntry1] : Gold Jerez is a 62 year old M with a PMHx of CAD s/p stent in December 2019, HTN, HLD, mild mitral regurgitation, chronic lower back pain and focal dystonia (writer's cramp). He presents for follow up in the Movement Disorders Clinic at [facility]. He was referred for focal dystonia evaluation by Dr. Najjar as his Neurologist has retired, Dr. Franchesca Lu, affiliated with [facility].\par \par The patient's focal dystonia is currently under control. At this time, will continue the current course of treatment with Artane and Clonazepam. We also discussed potentially decreasing these medications to evaluate for improvement in his cognition as clonazepam can affect memory. \par MoCA score was 26/30, which is normal. MRI brain and labs were also normal. \par \par Plan:\par - Continue Artane and Clonazepam as prescribed\par \par RTC 6 months\par \par All questions were answered to the best of my knowledge. I spent 30 minutes with this patient.

## 2022-10-30 ENCOUNTER — TRANSCRIPTION ENCOUNTER (OUTPATIENT)
Age: 62
End: 2022-10-30

## 2022-10-31 ENCOUNTER — APPOINTMENT (OUTPATIENT)
Dept: CARDIOLOGY | Facility: CLINIC | Age: 62
End: 2022-10-31

## 2022-10-31 VITALS
WEIGHT: 180 LBS | HEART RATE: 87 BPM | OXYGEN SATURATION: 95 % | BODY MASS INDEX: 28.25 KG/M2 | DIASTOLIC BLOOD PRESSURE: 70 MMHG | HEIGHT: 67 IN | SYSTOLIC BLOOD PRESSURE: 120 MMHG

## 2022-10-31 PROCEDURE — 99214 OFFICE O/P EST MOD 30 MIN: CPT

## 2022-10-31 PROCEDURE — 93000 ELECTROCARDIOGRAM COMPLETE: CPT

## 2022-10-31 RX ORDER — CHROMIUM 200 MCG
TABLET ORAL
Refills: 0 | Status: DISCONTINUED | COMMUNITY
End: 2022-10-31

## 2022-10-31 RX ORDER — BACILLUS COAGULANS/INULIN 1B-250 MG
CAPSULE ORAL
Refills: 0 | Status: DISCONTINUED | COMMUNITY
End: 2022-10-31

## 2022-10-31 RX ORDER — AMLODIPINE BESYLATE 5 MG/1
5 TABLET ORAL DAILY
Qty: 90 | Refills: 3 | Status: DISCONTINUED | COMMUNITY
Start: 2021-01-21 | End: 2022-10-31

## 2022-10-31 RX ORDER — OMEGA-3/DHA/EPA/FISH OIL 300-1000MG
1000 CAPSULE ORAL
Refills: 0 | Status: DISCONTINUED | COMMUNITY
End: 2022-10-31

## 2022-10-31 NOTE — PHYSICAL EXAM
[Well Developed] : well developed [Well Nourished] : well nourished [No Acute Distress] : no acute distress [Normal Conjunctiva] : normal conjunctiva [Normal Venous Pressure] : normal venous pressure [No Carotid Bruit] : no carotid bruit [Normal S1, S2] : normal S1, S2 [No Murmur] : no murmur [No Rub] : no rub [No Gallop] : no gallop [Clear Lung Fields] : clear lung fields [Good Air Entry] : good air entry [No Respiratory Distress] : no respiratory distress  [No Masses/organomegaly] : no masses/organomegaly [Normal Gait] : normal gait [No Rash] : no rash [No Skin Lesions] : no skin lesions [Moves all extremities] : moves all extremities [No Focal Deficits] : no focal deficits [Normal Speech] : normal speech [Alert and Oriented] : alert and oriented [Normal memory] : normal memory

## 2022-10-31 NOTE — ASSESSMENT
[FreeTextEntry1] : Mr. Jerez is a 62-year-old man with history of CAD (PCI RCA, mLAD 40% 12/2019), HTN, and HLD presenting for follow up.\par \par Impression:\par (1) Coronary artery disease, s/p elective PCI of the RCA in 2019 with residual 40% mLAD disease, LDL 82 on Zetia, did not tolerate rosuvastatin or atorvastatin 2/2 back pain, has non-cardiac chest discomfort with otherwise no anginal complaints.\par (2) HTN, on enalapril 20mg BID and amlodipine 2.5mg daily, suboptimal control based on home recordings\par (3) HLD\par \par Plan:\par - Discussed, at length, indication for statins in history of ASCVD with PCI. At the end of our discussion he was amenable to trying pravastatin 10mg. Ideally LDL should be <70 given hx of PCI and <55 for plaque regression.\par - Recheck lipids >6 weeks after starting statin.\par - Increased amlodipine to 5mg daily. Low threshold to increase to 10mg to target  given hx of maternal CVA.\par - Preventive strategies were discussed including exercise recommendations and dietary changes (Mediterranean diet).\par \par RTC in 3-6 months to see Dr. Jackson.

## 2022-10-31 NOTE — HISTORY OF PRESENT ILLNESS
[FreeTextEntry1] : Mr. Jerez is a 62-year-old man with history of CAD (pci RCA, mLAD 40% 12/2019), HTN, and HLD presenting for follow up.\par \par Patient follows with Dr. Jackson and was last seen on 6/2022. At that point reported feeling tired but was overall doing well. He was started on Welchol after a long discussion about the various treatments for hyperlipidemia.\par \par Since then he has repeated his cholesterol panel: , , HDL 42, LDL 82, non-.\par \par Today, he notes intermittent fleeting chest discomfort, non-exertional. Otherwise is doing well without focal complaints.\par \par TTE 11/1/2021 nl LVEF 59%, mild MR\par Exercise: 10K steps per day, yoga 3x/week, stretching\par Never used tobacco\par Father: SCD 57, attributed to MI; smoker\par Mother: HTN, CVA \par

## 2022-10-31 NOTE — CARDIOLOGY SUMMARY
[de-identified] : EKG 6/27/22 sinus bradycardia 54 bpm\par EKG 3/30/22 NSR 70 bpm, no ischemia [de-identified] : TTE 11/1/21 nl LV sys function, LVEF 59%, mild MR [de-identified] : Stress echo 11/2022: 10.3 mets, normal exercise stress test, echo negative for ischemia

## 2022-11-08 LAB
ALBUMIN SERPL ELPH-MCNC: 4.4 G/DL
ALP BLD-CCNC: 69 U/L
ALT SERPL-CCNC: 24 U/L
AST SERPL-CCNC: 22 U/L
BILIRUB DIRECT SERPL-MCNC: <0.2 MG/DL
BILIRUB INDIRECT SERPL-MCNC: >0.4 MG/DL
BILIRUB SERPL-MCNC: 0.6 MG/DL
CHOLEST SERPL-MCNC: 148 MG/DL
HDLC SERPL-MCNC: 42 MG/DL
LDLC SERPL CALC-MCNC: 82 MG/DL
NONHDLC SERPL-MCNC: 106 MG/DL
PROT SERPL-MCNC: 6.9 G/DL
TRIGL SERPL-MCNC: 122 MG/DL

## 2023-02-02 ENCOUNTER — APPOINTMENT (OUTPATIENT)
Dept: CARDIOLOGY | Facility: CLINIC | Age: 63
End: 2023-02-02
Payer: COMMERCIAL

## 2023-02-02 VITALS
OXYGEN SATURATION: 96 % | DIASTOLIC BLOOD PRESSURE: 72 MMHG | SYSTOLIC BLOOD PRESSURE: 120 MMHG | WEIGHT: 180.4 LBS | HEART RATE: 64 BPM | BODY MASS INDEX: 28.31 KG/M2 | HEIGHT: 67 IN

## 2023-02-02 PROCEDURE — 99214 OFFICE O/P EST MOD 30 MIN: CPT

## 2023-02-02 RX ORDER — COLESEVELAM HYDROCHLORIDE 625 MG/1
625 TABLET, COATED ORAL DAILY
Qty: 540 | Refills: 0 | Status: DISCONTINUED | COMMUNITY
Start: 2022-06-28 | End: 2023-02-02

## 2023-02-02 RX ORDER — AMLODIPINE BESYLATE 2.5 MG/1
2.5 TABLET ORAL DAILY
Qty: 30 | Refills: 2 | Status: DISCONTINUED | COMMUNITY
Start: 2022-11-22 | End: 2023-02-02

## 2023-02-02 NOTE — HISTORY OF PRESENT ILLNESS
[FreeTextEntry1] : 62M with CAD s/p PCI RCA, mLAD 40% 12/2019, DLD, HTN here for follow-up to discuss treatment of his dyslipidemia.   \par \par 2/2/23\par Did no start pravastatin or Welchol\par \par Taking amlodipine 7.5 mg daily, SBP remains 130-140s\par \par Not Exercising as much during winter. \par \par 6/27/22\par Overall feels tired. No chest pain, shortness of breath, palpitations, lightheadedness/dizziness, pre-syncope/syncope, or lower extremity edema. Walked 10K steps yersteday. Exercise includes walking, stretching, yoga, hikes, swimming. No claudication. \par \par He reviewed safety profiles and insurance coverage of bempedoic acid, Welchol, PCSK9 inhibitors, inclisiran. He does not want to try Repatha given his history of back pain. No injectables due to vasovagal syncope with needles. \par \par Checks BP on weekends. Compliant with medications. \par \par AM: 150-153/84\par /78\par \par Enalapril 10 AM and 2230 PM\par Amlodipine at 10 AM\par \par \par 3/30/22\par Burning sensation in chest. Lasts 10-15 minutes. Occurs at rest or when supine. Improves with water. Not exacerbated by exertion. \par \par Zetia 10 mg daily - tolerating \par Joints ache on statins, debilitating back spasms on both Crestor and Lipitor. Hesitant to try PCSK9i, concerned about side effects. \par \par Checks BP on weekends -150/80, 120-135/70s. Compliant with enalapril 20 mg BID and amlodipine 2.5. \par \par TTE 11/1/2021 nl LVEF 59%, mild MR\par \par Exercise: 10K steps per day, yoga 3x/week, stretching\par \par Never used tobacco\par \par Father: SCD 57, attributed to MI; smoker\par Mother: HTN, CVA

## 2023-02-02 NOTE — CARDIOLOGY SUMMARY
[de-identified] : 10/31/22 Normal sinus rhythm 87 bpm\par EKG 6/27/22 sinus bradycardia 54 bpm\par EKG 3/30/22 NSR 70 bpm, no ischemia [de-identified] : TTE 11/1/21 nl LV sys function, LVEF 59%, mild MR [de-identified] : Stress echo 11/2022: 10.3 mets, normal exercise stress test, echo negative for ischemia

## 2023-02-02 NOTE — PHYSICAL EXAM
[Well Developed] : well developed [Well Nourished] : well nourished [No Acute Distress] : no acute distress [Normal Conjunctiva] : normal conjunctiva [Normal Venous Pressure] : normal venous pressure [No Carotid Bruit] : no carotid bruit [Normal S1, S2] : normal S1, S2 [No Murmur] : no murmur [No Rub] : no rub [No Gallop] : no gallop [Clear Lung Fields] : clear lung fields [Good Air Entry] : good air entry [Soft] : abdomen soft [Non Tender] : non-tender [Moves all extremities] : moves all extremities [No Focal Deficits] : no focal deficits [Normal Speech] : normal speech [No edema] : no edema

## 2023-02-02 NOTE — ASSESSMENT
[FreeTextEntry1] : Assessment:\par #DLD - not at goal\par - statin intolerant\par - 3/24/22 , TG 96, HDL 46,  on ezetimibe 10 mg daily\par - 10/31/22 , , HDL 42, LDL 82 on Zetia 10 mg daily \par #CAD s/p RCA PCI 2019\par - also with 40% mLAD\par #HTN - not at goal\par \par \par \par Plan:\par - Increase amlodipine from 7.5 mg daily to 10 mg daily \par - Patient to start pravastatin 10 mg daily, repeat lipid profile 3 months from starting statin \par - Labs\par - Carotid US to screen for ICA stenosis given risk factors\par - Will request recent LE US from PCP, if not KYLE/PVR or LE arterial US will order \par - Continue Zetia, ASA 81 mg daily \par - Continue enalapril 20 mg BID \par - Return to clinic in 3 months  Patient's belongings returned

## 2023-02-02 NOTE — REVIEW OF SYSTEMS
[Negative] : Cardiovascular [Weight Gain (___ Lbs)] : no recent weight gain [Feeling Fatigued] : not feeling fatigued [Dizziness] : no dizziness

## 2023-02-10 ENCOUNTER — RX RENEWAL (OUTPATIENT)
Age: 63
End: 2023-02-10

## 2023-02-17 ENCOUNTER — APPOINTMENT (OUTPATIENT)
Dept: OTOLARYNGOLOGY | Facility: CLINIC | Age: 63
End: 2023-02-17
Payer: COMMERCIAL

## 2023-02-17 VITALS — BODY MASS INDEX: 28.12 KG/M2 | WEIGHT: 175 LBS | HEIGHT: 66 IN

## 2023-02-17 DIAGNOSIS — H93.8X2 OTHER SPECIFIED DISORDERS OF LEFT EAR: ICD-10-CM

## 2023-02-17 DIAGNOSIS — J34.89 OTHER SPECIFIED DISORDERS OF NOSE AND NASAL SINUSES: ICD-10-CM

## 2023-02-17 PROCEDURE — 92557 COMPREHENSIVE HEARING TEST: CPT

## 2023-02-17 PROCEDURE — 99203 OFFICE O/P NEW LOW 30 MIN: CPT | Mod: 25

## 2023-02-17 PROCEDURE — 92550 TYMPANOMETRY & REFLEX THRESH: CPT

## 2023-02-17 NOTE — HISTORY OF PRESENT ILLNESS
[FreeTextEntry1] : Patient is present today c/o hearing loss. He has been experiencing hearing loss in left ear for months. He denies any irritation in right ear. He feels as though he is congested a lot. He feels like he has a cold all the time. He denies any pain or bleeding or discharge coming out the ear. He has not used any form of treatment. Has started sleep study 2 days ago 2/15/23.

## 2023-02-17 NOTE — ASSESSMENT
[FreeTextEntry1] : I reviewed, interpreted, and discussed the Audiogram done today. Mild SNHL. \par

## 2023-03-15 ENCOUNTER — OUTPATIENT (OUTPATIENT)
Dept: OUTPATIENT SERVICES | Facility: HOSPITAL | Age: 63
LOS: 1 days | End: 2023-03-15
Payer: COMMERCIAL

## 2023-03-15 ENCOUNTER — APPOINTMENT (OUTPATIENT)
Dept: CARDIOLOGY | Facility: CLINIC | Age: 63
End: 2023-03-15
Payer: COMMERCIAL

## 2023-03-15 DIAGNOSIS — M54.16 RADICULOPATHY, LUMBAR REGION: ICD-10-CM

## 2023-03-15 DIAGNOSIS — Z98.890 OTHER SPECIFIED POSTPROCEDURAL STATES: Chronic | ICD-10-CM

## 2023-03-15 DIAGNOSIS — Z00.8 ENCOUNTER FOR OTHER GENERAL EXAMINATION: ICD-10-CM

## 2023-03-15 PROCEDURE — 93923 UPR/LXTR ART STDY 3+ LVLS: CPT

## 2023-03-15 PROCEDURE — 93880 EXTRACRANIAL BILAT STUDY: CPT

## 2023-03-15 PROCEDURE — 72148 MRI LUMBAR SPINE W/O DYE: CPT | Mod: 26

## 2023-03-15 PROCEDURE — 72148 MRI LUMBAR SPINE W/O DYE: CPT

## 2023-03-15 PROCEDURE — 93925 LOWER EXTREMITY STUDY: CPT

## 2023-03-16 DIAGNOSIS — M54.16 RADICULOPATHY, LUMBAR REGION: ICD-10-CM

## 2023-05-03 LAB
ALBUMIN SERPL ELPH-MCNC: 4.7 G/DL
ALP BLD-CCNC: 70 U/L
ALT SERPL-CCNC: 25 U/L
ANION GAP SERPL CALC-SCNC: 14 MMOL/L
AST SERPL-CCNC: 25 U/L
BILIRUB SERPL-MCNC: 1 MG/DL
BUN SERPL-MCNC: 17 MG/DL
CALCIUM SERPL-MCNC: 9.4 MG/DL
CHLORIDE SERPL-SCNC: 102 MMOL/L
CHOLEST SERPL-MCNC: 158 MG/DL
CO2 SERPL-SCNC: 25 MMOL/L
CREAT SERPL-MCNC: 0.9 MG/DL
EGFR: 95 ML/MIN/1.73M2
ESTIMATED AVERAGE GLUCOSE: 120 MG/DL
GLUCOSE SERPL-MCNC: 93 MG/DL
HBA1C MFR BLD HPLC: 5.8 %
HDLC SERPL-MCNC: 51 MG/DL
LDLC SERPL CALC-MCNC: 81 MG/DL
NONHDLC SERPL-MCNC: 107 MG/DL
POTASSIUM SERPL-SCNC: 4.5 MMOL/L
PROT SERPL-MCNC: 7.2 G/DL
SODIUM SERPL-SCNC: 141 MMOL/L
TRIGL SERPL-MCNC: 130 MG/DL

## 2023-05-09 ENCOUNTER — APPOINTMENT (OUTPATIENT)
Dept: CARDIOLOGY | Facility: CLINIC | Age: 63
End: 2023-05-09
Payer: COMMERCIAL

## 2023-05-09 ENCOUNTER — APPOINTMENT (OUTPATIENT)
Dept: ORTHOPEDIC SURGERY | Facility: CLINIC | Age: 63
End: 2023-05-09
Payer: COMMERCIAL

## 2023-05-09 VITALS — BODY MASS INDEX: 28.93 KG/M2 | HEIGHT: 66 IN | WEIGHT: 180 LBS

## 2023-05-09 VITALS
BODY MASS INDEX: 29.41 KG/M2 | SYSTOLIC BLOOD PRESSURE: 128 MMHG | HEIGHT: 66 IN | WEIGHT: 183 LBS | HEART RATE: 57 BPM | DIASTOLIC BLOOD PRESSURE: 76 MMHG

## 2023-05-09 DIAGNOSIS — M25.552 PAIN IN RIGHT HIP: ICD-10-CM

## 2023-05-09 DIAGNOSIS — M70.62 TROCHANTERIC BURSITIS, RIGHT HIP: ICD-10-CM

## 2023-05-09 DIAGNOSIS — Z71.3 DIETARY COUNSELING AND SURVEILLANCE: ICD-10-CM

## 2023-05-09 DIAGNOSIS — M76.31 ILIOTIBIAL BAND SYNDROME, RIGHT LEG: ICD-10-CM

## 2023-05-09 DIAGNOSIS — I25.9 CHRONIC ISCHEMIC HEART DISEASE, UNSPECIFIED: ICD-10-CM

## 2023-05-09 DIAGNOSIS — M25.551 PAIN IN RIGHT HIP: ICD-10-CM

## 2023-05-09 DIAGNOSIS — G89.29 PAIN IN RIGHT HIP: ICD-10-CM

## 2023-05-09 DIAGNOSIS — M70.61 TROCHANTERIC BURSITIS, RIGHT HIP: ICD-10-CM

## 2023-05-09 DIAGNOSIS — M76.32 ILIOTIBIAL BAND SYNDROME, RIGHT LEG: ICD-10-CM

## 2023-05-09 PROCEDURE — 93000 ELECTROCARDIOGRAM COMPLETE: CPT

## 2023-05-09 PROCEDURE — 99214 OFFICE O/P EST MOD 30 MIN: CPT

## 2023-05-09 PROCEDURE — 73502 X-RAY EXAM HIP UNI 2-3 VIEWS: CPT

## 2023-05-09 PROCEDURE — 99203 OFFICE O/P NEW LOW 30 MIN: CPT

## 2023-05-09 NOTE — PHYSICAL EXAM
[Well Developed] : well developed [Well Nourished] : well nourished [No Acute Distress] : no acute distress [Normal Conjunctiva] : normal conjunctiva [No Carotid Bruit] : no carotid bruit [Normal S1, S2] : normal S1, S2 [No Murmur] : no murmur [No Rub] : no rub [No Gallop] : no gallop [Clear Lung Fields] : clear lung fields [Good Air Entry] : good air entry [Soft] : abdomen soft [Non Tender] : non-tender [Moves all extremities] : moves all extremities [No Focal Deficits] : no focal deficits [Normal Speech] : normal speech [Trace] : Left: trace

## 2023-05-09 NOTE — CARDIOLOGY SUMMARY
[de-identified] : EKG 5/9/23 sinus morenita 57 bpm\par 10/31/22 Normal sinus rhythm 87 bpm\par EKG 6/27/22 sinus bradycardia 54 bpm\par EKG 3/30/22 NSR 70 bpm, no ischemia [de-identified] : TTE 11/1/21 nl LV sys function, LVEF 59%, mild MR [de-identified] : Stress echo 11/2022: 10.3 mets, normal exercise stress test, echo negative for ischemia [de-identified] : KYLE/PVR 3/15/23 normal study, R KYLE 1.13, L KYLE 1.32\par LE ART US 3/15/23 no evidence of significant athero bilaterally\par Carotid US 3/15/23 R and L ICA <50% stenosis

## 2023-05-09 NOTE — ASSESSMENT
[FreeTextEntry1] :   Pleasant early middle-aged gentleman no distress.  Physical examination is consistent mostly with some iliotibial band syndrome and mild trochanteric bursitis.  Recommend course of physical therapy focusing on teaching him a self-directed exercise program in iliotibial band stretching hip abductor stretching and strengthening core strengthening.  Modalities could be utilized adjunct therapy.  If he has worsening symptoms of pain would consider NSAID use but would do so judiciously with his history of coronary artery disease and stent placement.  He can review with his cardiologist.  Worsening symptoms pain he is welcome to come back sooner to see me.  Would consider cortisone injection.  Will give him a follow-up appointment for 3-6 months.  If his symptoms have improved he can cancel follow-up visit.  All questions were answered to his satisfaction and he agrees with the plan.

## 2023-05-09 NOTE — ASSESSMENT
[FreeTextEntry1] : Assessment:\par #DLD - not at goal\par - statin intolerant\par - 3/24/22 , TG 96, HDL 46,  on ezetimibe 10 mg daily\par - 10/31/22 , , HDL 42, LDL 82 on Zetia 10 mg daily \par - 5/2/23 , , HDL 51, LDL 81, AST 25, ALT 25 on pravastatin 10 for 14 days\par #CAD s/p RCA PCI 2019\par - also with 40% mLAD\par #HTN - not at goal\par #Pre-DM 5/2023 A1c 5.8%\par \par Plan:\par - Dietician referral \par - Continue pravastatin 10 mg daily, repeat lipid profile 3 months from starting statin \par - Continue Zetia, ASA 81 mg daily \par - Continue enalapril 20 mg BID, amlodipine 10 mg daily (he will notify me of worsening LE edema)\par - TTM in 3 months to discuss lipid panel\par - Return to clinic in 6 months

## 2023-05-09 NOTE — HISTORY OF PRESENT ILLNESS
[de-identified] :  63-year-old gentleman presents this office for evaluation of bilateral lateral hip pain for the better part of a year.  He denies any clear traumatic event the proceeded the development of the pain.  He does contend with some chronic low back pain related degenerative disc disease.  He has had physical therapy in the past with this in routinely goes to yoga.  On rare occasions when he is is severe back flare-up he will take Naprosyn and or muscle relaxant (e.g. cyclobenzaprine.)  Mostly has complaints of lateral thigh and hip pain when he sleeps at night on the affected signs.  The pain occurs on both sides.  Tends to sleep on his sides.  He notes that when he is not lying inside the pain is better.  Essentially goes away when he standing and walking.  He does not utilize any assistive devices to walk.  No groin pain.\par \par Past medical history significant for chronic low back pain, coronary artery disease status post stent placement, hypertension, and focal dystonia

## 2023-05-09 NOTE — IMAGING
[de-identified] :  Pleasant middle-aged gentleman sits comfortably my office no distress.  He is able get onto the exam table with no assistance.\par \par Physical examination:\par Bilateral hips:  Tenderness palpation along the iliotibial band just distal to the greater trochanteric bursa.  No undue swelling in the area.  No tenderness palpation of the inguinal crease bilaterally.  He has no pain with forced internal rotation of hip joints at full extension and 90° of flexion.  No pain with KETAN maneuver.  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  None the less joint space reasonably well preserved with roughly 4.7-4.9 mm of joint space in both hips.  The left hip does demonstrate some osteophytes off of the lateral aspect of his hip with subchondral cystic changes of severe these but these are small.  There is no subchondral sclerotic changes.

## 2023-05-09 NOTE — REVIEW OF SYSTEMS
[Weight Gain (___ Lbs)] : no recent weight gain [Feeling Fatigued] : not feeling fatigued [SOB] : no shortness of breath [Dyspnea on exertion] : not dyspnea during exertion [Chest Discomfort] : no chest discomfort [Lower Ext Edema] : lower extremity edema [Leg Claudication] : no intermittent leg claudication [Palpitations] : no palpitations [Orthopnea] : no orthopnea [PND] : no PND [Syncope] : no syncope [Myalgia] : myalgia [Dizziness] : no dizziness

## 2023-05-09 NOTE — HISTORY OF PRESENT ILLNESS
[FreeTextEntry1] : 63M with CAD s/p PCI RCA, mLAD 40% 12/2019, DLD, HTN, ERASTO on CPAP here for follow-up.   \par \par 5/9/23\par Retiring June 30, 2023\par Increased amlodpine to 10 mg daily\par 4/17/23 started pravastatin, tamsulosin, CPAP\par AM /70s, PM /63, 114/62\par \par Trace LE edema - wife noticed\par \par No chest pain, shortness of breath, palpitations, lightheadedness/dizziness, pre-syncope/syncope\par \par 2/2/23\par Did no start pravastatin or Welchol\par \par Taking amlodipine 7.5 mg daily, SBP remains 130-140s\par \par Not Exercising as much during winter. \par \par 6/27/22\par Overall feels tired. No chest pain, shortness of breath, palpitations, lightheadedness/dizziness, pre-syncope/syncope, or lower extremity edema. Walked 10K steps yersteday. Exercise includes walking, stretching, yoga, hikes, swimming. No claudication. \par \par He reviewed safety profiles and insurance coverage of bempedoic acid, Welchol, PCSK9 inhibitors, inclisiran. He does not want to try Repatha given his history of back pain. No injectables due to vasovagal syncope with needles. \par \par Checks BP on weekends. Compliant with medications. \par \par AM: 150-153/84\par /78\par \par Enalapril 10 AM and 2230 PM\par Amlodipine at 10 AM\par \par \par 3/30/22\par Burning sensation in chest. Lasts 10-15 minutes. Occurs at rest or when supine. Improves with water. Not exacerbated by exertion. \par \par Zetia 10 mg daily - tolerating \par Joints ache on statins, debilitating back spasms on both Crestor and Lipitor. Hesitant to try PCSK9i, concerned about side effects. \par \par Checks BP on weekends -150/80, 120-135/70s. Compliant with enalapril 20 mg BID and amlodipine 2.5. \par \par TTE 11/1/2021 nl LVEF 59%, mild MR\par \par Exercise: 10K steps per day, yoga 3x/week, stretching\par \par Never used tobacco\par \par Father: SCD 57, attributed to MI; smoker\par Mother: HTN, CVA

## 2023-05-17 ENCOUNTER — APPOINTMENT (OUTPATIENT)
Dept: CARDIOLOGY | Facility: CLINIC | Age: 63
End: 2023-05-17
Payer: COMMERCIAL

## 2023-05-17 PROCEDURE — 97802 MEDICAL NUTRITION INDIV IN: CPT | Mod: 95

## 2023-05-23 ENCOUNTER — APPOINTMENT (OUTPATIENT)
Dept: ORTHOPEDIC SURGERY | Facility: CLINIC | Age: 63
End: 2023-05-23

## 2023-05-24 ENCOUNTER — APPOINTMENT (OUTPATIENT)
Dept: NEUROLOGY | Facility: CLINIC | Age: 63
End: 2023-05-24
Payer: COMMERCIAL

## 2023-05-24 VITALS
BODY MASS INDEX: 28.93 KG/M2 | TEMPERATURE: 98 F | WEIGHT: 180 LBS | HEART RATE: 49 BPM | SYSTOLIC BLOOD PRESSURE: 115 MMHG | HEIGHT: 66 IN | OXYGEN SATURATION: 98 % | DIASTOLIC BLOOD PRESSURE: 69 MMHG

## 2023-05-24 DIAGNOSIS — M25.531 PAIN IN RIGHT WRIST: ICD-10-CM

## 2023-05-24 PROCEDURE — 99214 OFFICE O/P EST MOD 30 MIN: CPT

## 2023-05-24 RX ORDER — TAMSULOSIN HYDROCHLORIDE 0.4 MG/1
0.4 CAPSULE ORAL
Refills: 0 | Status: DISCONTINUED | COMMUNITY
End: 2023-05-24

## 2023-05-24 NOTE — DISCUSSION/SUMMARY
[FreeTextEntry1] : Gold Jerez is a 62 year old M with a PMHx of CAD s/p stent in December 2019, HTN, HLD, mild mitral regurgitation, chronic lower back pain and focal dystonia (writer's cramp). He presents for follow up in the Movement Disorders Clinic at [facility]. He was referred for focal dystonia evaluation by Dr. Najjar as his Neurologist has retired, Dr. Franchesca Lu, affiliated with [facility].\par \par The patient's focal dystonia is currently under control. At this time, will continue the current course of treatment with Artane and Clonazepam. Cognition is stable.\par MoCA score was 26/30, which is normal previously. MRI brain and labs were also normal. \par MRI L spine without contrast was negative for spinal stenosis, showed mild herniations, and straightening of lumbar lordosis, suggestive of muscle spasms.\par \par Plan:\par - Continue Artane and Clonazepam as prescribed\par - Consider voltaren gel or lidocaine patch for lower back pain\par - Continue PT\par - EMG/NCS to rule out carpal tunnel\par \par RTC 6 months\par \par All questions were answered to the best of my knowledge. I spent 30 minutes with this patient.

## 2023-05-24 NOTE — HISTORY OF PRESENT ILLNESS
[FreeTextEntry1] : Gold Jerez is a 63 year old M with a PMHx of CAD s/p stent in December 2019, HTN, HLD, mild mitral regurgitation, chronic lower back pain and focal dystonia (writer's cramp), ERASTO on CPAP. He presents for follow up in the Movement Disorders Clinic at [facility]. He was referred for focal dystonia evaluation by Dr. Najjar as his Neurologist has retired, Dr. Franchesca Lu, affiliated with [facility].\par \par Interval history:\par Since last visit, he reports swelling in the hands and feet after walking. Sometimes he has wrist pain in the right hand. No numbness/tingling. No weakness. Otherwise, he is stable. He started using CPAP since the last visit. \par When he sleeps at night his muscles get stiff and he has pain in the leg that wakes him up. He has seen vascular and saw any orthopedist, no hip issues. He has lower back pain. \par He had an MRI L spine that did not show significant spinal stenosis, mild herniations. Straightening of lordosis.\par \par Current meds:\par Artane 1mg every other day\par Clonazepam 0.5mg BID\par Enalapril\par Amlodipine\par Aspirin\par Pantoprazole\par Ezetimibe \par \par Prior meds:\par Statins - joint pain and back spasms\par \par Initial history:\par His focal dystonia has been relatively well controlled. It started about 28 years ago. \par He was writing one day, his right hand involuntary jumps/jerking into the air. He has not had a relapse since treatment. He tolerates the medications well. He was told that discontinuing the medication, if he had a relapse he would need a higher dosage and prone to side effects as a result. Botox was not recommended because he was told that botox would cause loss of muscle tone overtime. \par He usually types for work now. His handwriting is worse over the years and difficult to read now. If he does write a lot, his hand does stiffen up. \par No interruption with his activities of daily living. \par \par He has had a few EMGs with no significant findings. \par Memory testing previously normal. \par He did have neuroimaging when he was first diagnosed.\par \par Family history: unremarkable, no dystonia\par Social history:  for [employer] x 16 years

## 2023-05-24 NOTE — ASSESSMENT
[FreeTextEntry1] : Gold Jerez is a 62 year old M with a PMHx of CAD s/p stent in December 2019, HTN, HLD, mild mitral regurgitation, chronic lower back pain and focal dystonia (writer's cramp).\par He presents for follow up in the Movement Disorders Clinic at [facility]. He was referred for focal dystonia evaluation by Dr. Najjar as his Neurologist has retired, Dr. Franchesca Lu, affiliated with [facility].\par \par On exam, patient found to have decreased fine motor skill on the right hand. Otherwise normal gait and balance. Exam appears stable from prior visits.\par \par \par Plan:\par - Continue Artane and Clonazepam as prescribed\par - Labs: B12, folate, TSH, B1 - Reviewed, all wnl\par - MRI brain without contrast - reviewed, unremarkable\par - will plan to repeat MOCA during next visit \par - will send refill clonazepam\par \par RTC 6 months\par

## 2023-05-24 NOTE — PHYSICAL EXAM
[General Appearance - Alert] : alert [General Appearance - In No Acute Distress] : in no acute distress [General Appearance - Well-Appearing] : healthy appearing [Oriented To Time, Place, And Person] : oriented to person, place, and time [Person] : oriented to person [Place] : oriented to place [Time] : oriented to time [Concentration Intact] : normal concentrating ability [Comprehension] : comprehension intact [Cranial Nerves Optic (II)] : visual acuity intact bilaterally,  visual fields full to confrontation, pupils equal round and reactive to light [Cranial Nerves Oculomotor (III)] : extraocular motion intact [Cranial Nerves Trigeminal (V)] : facial sensation intact symmetrically [Cranial Nerves Facial (VII)] : face symmetrical [Cranial Nerves Vestibulocochlear (VIII)] : hearing was intact bilaterally [Cranial Nerves Glossopharyngeal (IX)] : tongue and palate midline [Cranial Nerves Accessory (XI - Cranial And Spinal)] : head turning and shoulder shrug symmetric [Cranial Nerves Hypoglossal (XII)] : there was no tongue deviation with protrusion [Motor Strength] : muscle strength was normal in all four extremities [Sensation Tactile Decrease] : light touch was intact [Balance] : balance was intact [Neck Appearance] : the appearance of the neck was normal [] : no respiratory distress [Exaggerated Use Of Accessory Muscles For Inspiration] : no accessory muscle use [Edema] : there was no peripheral edema [Abnormal Walk] : normal gait [Involuntary Movements] : no involuntary movements were seen [Paresis Pronator Drift Right-Sided] : no pronator drift on the right [Paresis Pronator Drift Left-Sided] : no pronator drift on the left [FreeTextEntry1] : No significant rigidity or tremors.\par Mild bradykinesia on the right with finger tapping and rapid sequential movements. \par Gait with slight decreased right arm swing, otherwise normal.\par Postural reflexes are intact.\par Handwriting was jerky, but no abnormal dystonic posturing while writing.  [FreeTextEntry8] : No abnormal posturing

## 2023-06-21 ENCOUNTER — APPOINTMENT (OUTPATIENT)
Dept: CARDIOLOGY | Facility: CLINIC | Age: 63
End: 2023-06-21
Payer: COMMERCIAL

## 2023-06-21 PROCEDURE — 97802 MEDICAL NUTRITION INDIV IN: CPT | Mod: 95

## 2023-09-21 ENCOUNTER — APPOINTMENT (OUTPATIENT)
Dept: CARDIOLOGY | Facility: CLINIC | Age: 63
End: 2023-09-21
Payer: COMMERCIAL

## 2023-09-21 PROCEDURE — 99442: CPT

## 2023-11-14 ENCOUNTER — APPOINTMENT (OUTPATIENT)
Dept: CARDIOLOGY | Facility: CLINIC | Age: 63
End: 2023-11-14
Payer: COMMERCIAL

## 2023-11-14 VITALS
DIASTOLIC BLOOD PRESSURE: 70 MMHG | HEIGHT: 66 IN | WEIGHT: 170 LBS | HEART RATE: 45 BPM | BODY MASS INDEX: 27.32 KG/M2 | SYSTOLIC BLOOD PRESSURE: 122 MMHG

## 2023-11-14 DIAGNOSIS — Z71.89 OTHER SPECIFIED COUNSELING: ICD-10-CM

## 2023-11-14 DIAGNOSIS — Z71.82 EXERCISE COUNSELING: ICD-10-CM

## 2023-11-14 PROCEDURE — 99214 OFFICE O/P EST MOD 30 MIN: CPT

## 2023-11-14 PROCEDURE — 93000 ELECTROCARDIOGRAM COMPLETE: CPT

## 2023-11-14 RX ORDER — ASPIRIN ENTERIC COATED TABLETS 81 MG 81 MG/1
81 TABLET, DELAYED RELEASE ORAL
Qty: 90 | Refills: 3 | Status: ACTIVE | COMMUNITY
Start: 2021-01-21 | End: 1900-01-01

## 2023-12-27 ENCOUNTER — NON-APPOINTMENT (OUTPATIENT)
Age: 63
End: 2023-12-27

## 2024-01-24 ENCOUNTER — APPOINTMENT (OUTPATIENT)
Dept: NEUROLOGY | Facility: CLINIC | Age: 64
End: 2024-01-24
Payer: COMMERCIAL

## 2024-01-24 VITALS
WEIGHT: 167 LBS | BODY MASS INDEX: 26.84 KG/M2 | OXYGEN SATURATION: 99 % | DIASTOLIC BLOOD PRESSURE: 68 MMHG | HEIGHT: 66 IN | SYSTOLIC BLOOD PRESSURE: 115 MMHG | HEART RATE: 67 BPM

## 2024-01-24 PROCEDURE — 99214 OFFICE O/P EST MOD 30 MIN: CPT

## 2024-01-26 NOTE — DISCUSSION/SUMMARY
[FreeTextEntry1] : Gold Jerez is a 63 year old M with a PMHx of CAD s/p stent in December 2019, HTN, HLD, mild mitral regurgitation, chronic lower back pain and focal dystonia (writer's cramp). He presents for follow up in the Movement Disorders Clinic at [facility]. He was referred for focal dystonia evaluation by Dr. Najjar as his Neurologist has retired, Dr. Franchesca Lu, affiliated with [facility].  The patient's focal dystonia is currently under control. At this time, will continue the current course of treatment with Artane and Clonazepam. He feels his memory is worsening. MoCA score was 26/30, which is normal previously. MRI brain and labs were also normal.  MRI L spine without contrast was negative for spinal stenosis, showed mild herniations, and straightening of lumbar lordosis, suggestive of muscle spasms. He also reported tic like movements of the neck that happen intermittently. He was advised to monitor the frequency, severity and situations where this occurs as medications are available if he is interested.   Plan: - Continue Artane and Clonazepam as prescribed - EMG/NCS to rule out carpal tunnel and right lower extremity to assess quad pain, possibly arising from lower back - Referral for Neuropsychological memory testing  Patient will follow up with Movement Disorders  All questions were answered to the best of my knowledge. I spent 30 minutes with this patient.

## 2024-01-26 NOTE — PHYSICAL EXAM
[General Appearance - Alert] : alert [General Appearance - In No Acute Distress] : in no acute distress [General Appearance - Well-Appearing] : healthy appearing [Oriented To Time, Place, And Person] : oriented to person, place, and time [Person] : oriented to person [Time] : oriented to time [Place] : oriented to place [Concentration Intact] : normal concentrating ability [Cranial Nerves Optic (II)] : visual acuity intact bilaterally,  visual fields full to confrontation, pupils equal round and reactive to light [Cranial Nerves Oculomotor (III)] : extraocular motion intact [Comprehension] : comprehension intact [Cranial Nerves Trigeminal (V)] : facial sensation intact symmetrically [Cranial Nerves Facial (VII)] : face symmetrical [Cranial Nerves Vestibulocochlear (VIII)] : hearing was intact bilaterally [Cranial Nerves Glossopharyngeal (IX)] : tongue and palate midline [Cranial Nerves Accessory (XI - Cranial And Spinal)] : head turning and shoulder shrug symmetric [Cranial Nerves Hypoglossal (XII)] : there was no tongue deviation with protrusion [Motor Strength] : muscle strength was normal in all four extremities [Sensation Tactile Decrease] : light touch was intact [Balance] : balance was intact [Neck Appearance] : the appearance of the neck was normal [] : no respiratory distress [Exaggerated Use Of Accessory Muscles For Inspiration] : no accessory muscle use [Edema] : there was no peripheral edema [Abnormal Walk] : normal gait [Involuntary Movements] : no involuntary movements were seen [Paresis Pronator Drift Right-Sided] : no pronator drift on the right [Paresis Pronator Drift Left-Sided] : no pronator drift on the left [FreeTextEntry1] : No significant rigidity or tremors.\par  Mild bradykinesia on the right with finger tapping and rapid sequential movements. \par  Gait with slight decreased right arm swing, otherwise normal.\par  Postural reflexes are intact.\par  Handwriting was jerky, but no abnormal dystonic posturing while writing.  [FreeTextEntry8] : No abnormal posturing

## 2024-01-26 NOTE — HISTORY OF PRESENT ILLNESS
[FreeTextEntry1] : Gold Jerez is a 63 year old M with a PMHx of CAD s/p stent in December 2019, HTN, HLD, mild mitral regurgitation, chronic lower back pain and focal dystonia (writer's cramp), ERASTO on CPAP. He presents for follow up in the Movement Disorders Clinic at [facility]. He was referred for focal dystonia evaluation by Dr. Najjar as his Neurologist has retired, Dr. Franchesca Lu, affiliated with [facility].  Interval history: Since last visit, he reports when he is sleeping he gets pain in the right leg. Sometimes it is on the left, but primarily the right. Lack of motion provokes it. He denies an urge to move. It sometimes wakes him up. When he gets up and walks it is ok. Does not happen when he goes to bed. He is exercising. He retired last summer. Not typing or writing as much.   Current meds: Artane 1mg every other day Clonazepam 0.5mg BID Enalapril Amlodipine Aspirin Pantoprazole Ezetimibe   Prior meds: Statins - joint pain and back spasms  Initial history: His focal dystonia has been relatively well controlled. It started about 28 years ago.  He was writing one day, his right hand involuntary jumps/jerking into the air. He has not had a relapse since treatment. He tolerates the medications well. He was told that discontinuing the medication, if he had a relapse he would need a higher dosage and prone to side effects as a result. Botox was not recommended because he was told that botox would cause loss of muscle tone overtime.  He usually types for work now. His handwriting is worse over the years and difficult to read now. If he does write a lot, his hand does stiffen up.  No interruption with his activities of daily living.   He has had a few EMGs with no significant findings.  Memory testing previously normal.  He did have neuroimaging when he was first diagnosed.  Family history: unremarkable, no dystonia Social history:  for [employer] x 16 years

## 2024-02-01 ENCOUNTER — APPOINTMENT (OUTPATIENT)
Dept: NEUROLOGY | Facility: CLINIC | Age: 64
End: 2024-02-01

## 2024-03-10 PROBLEM — Z71.82 EXERCISE COUNSELING: Status: ACTIVE | Noted: 2023-05-09

## 2024-03-12 RX ORDER — TRIHEXYPHENIDYL HYDROCHLORIDE 2 MG/1
2 TABLET ORAL
Qty: 90 | Refills: 3 | Status: ACTIVE | COMMUNITY
Start: 1900-01-01 | End: 1900-01-01

## 2024-03-13 ENCOUNTER — APPOINTMENT (OUTPATIENT)
Dept: NEUROLOGY | Facility: CLINIC | Age: 64
End: 2024-03-13
Payer: MEDICARE

## 2024-03-13 ENCOUNTER — APPOINTMENT (OUTPATIENT)
Dept: NEUROLOGY | Facility: CLINIC | Age: 64
End: 2024-03-13

## 2024-03-13 VITALS
DIASTOLIC BLOOD PRESSURE: 78 MMHG | SYSTOLIC BLOOD PRESSURE: 127 MMHG | BODY MASS INDEX: 26.84 KG/M2 | HEART RATE: 47 BPM | HEIGHT: 66 IN | OXYGEN SATURATION: 97 % | WEIGHT: 167 LBS | RESPIRATION RATE: 16 BRPM

## 2024-03-13 DIAGNOSIS — G24.8 OTHER DYSTONIA: ICD-10-CM

## 2024-03-13 DIAGNOSIS — M54.50 LOW BACK PAIN, UNSPECIFIED: ICD-10-CM

## 2024-03-13 DIAGNOSIS — G89.29 LOW BACK PAIN, UNSPECIFIED: ICD-10-CM

## 2024-03-13 PROCEDURE — 95886 MUSC TEST DONE W/N TEST COMP: CPT

## 2024-03-13 PROCEDURE — 95912 NRV CNDJ TEST 11-12 STUDIES: CPT

## 2024-03-13 NOTE — HISTORY OF PRESENT ILLNESS
[FreeTextEntry1] : Referred for electrodiagnostic studies to discern possible entrapment neuropathy in hands.

## 2024-03-13 NOTE — PHYSICAL EXAM
[General Appearance - Alert] : alert [Oriented To Time, Place, And Person] : oriented to person, place, and time [General Appearance - In No Acute Distress] : in no acute distress [Affect] : the affect was normal [Impaired Insight] : insight and judgment were intact [Place] : oriented to place [Person] : oriented to person [Time] : oriented to time [Visual Intact] : visual attention was ~T not ~L decreased [Concentration Intact] : normal concentrating ability [Repeating Phrases] : no difficulty repeating a phrase [Naming Objects] : no difficulty naming common objects [Writing A Sentence] : no difficulty writing a sentence [Fluency] : fluency intact [Reading] : reading intact [Comprehension] : comprehension intact [Past History] : adequate knowledge of personal past history [Cranial Nerves Optic (II)] : visual acuity intact bilaterally,  visual fields full to confrontation, pupils equal round and reactive to light [Cranial Nerves Oculomotor (III)] : extraocular motion intact [Cranial Nerves Trigeminal (V)] : facial sensation intact symmetrically [Cranial Nerves Facial (VII)] : face symmetrical [Cranial Nerves Glossopharyngeal (IX)] : tongue and palate midline [Cranial Nerves Vestibulocochlear (VIII)] : hearing was intact bilaterally [Cranial Nerves Accessory (XI - Cranial And Spinal)] : head turning and shoulder shrug symmetric [Motor Tone] : muscle tone was normal in all four extremities [Cranial Nerves Hypoglossal (XII)] : there was no tongue deviation with protrusion [Motor Strength] : muscle strength was normal in all four extremities [Motor Handedness Right-Handed] : the patient is right hand dominant [No Muscle Atrophy] : normal bulk in all four extremities [Sensation Tactile Decrease] : light touch was intact [Sensation Pain / Temperature Decrease] : pain and temperature was intact [Abnormal Walk] : normal gait [Balance] : balance was intact [2+] : Ankle jerk right 2+ [Neck Appearance] : the appearance of the neck was normal [Romberg's Sign] : Romberg's sign was negtive [Past-pointing] : there was no past-pointing [Tremor] : no tremor present [Plantar Reflex Right Only] : normal on the right [Plantar Reflex Left Only] : normal on the left

## 2024-03-13 NOTE — PROCEDURE
[FreeTextEntry1] : 3/13/24 NCS/EMG: mild residual right C6 radiculopathy; mild right median nerve entrapment at the wrist.

## 2024-04-01 RX ORDER — PRAVASTATIN SODIUM 10 MG/1
10 TABLET ORAL DAILY
Qty: 90 | Refills: 3 | Status: ACTIVE | COMMUNITY
Start: 2022-10-31 | End: 1900-01-01

## 2024-04-16 ENCOUNTER — APPOINTMENT (OUTPATIENT)
Dept: NEUROPSYCHOLOGY | Facility: CLINIC | Age: 64
End: 2024-04-16

## 2024-04-16 ENCOUNTER — OUTPATIENT (OUTPATIENT)
Dept: OUTPATIENT SERVICES | Facility: HOSPITAL | Age: 64
LOS: 1 days | End: 2024-04-16
Payer: MEDICARE

## 2024-04-16 DIAGNOSIS — G31.84 MILD COGNITIVE IMPAIRMENT OF UNCERTAIN OR UNKNOWN ETIOLOGY: ICD-10-CM

## 2024-04-16 DIAGNOSIS — Z98.890 OTHER SPECIFIED POSTPROCEDURAL STATES: Chronic | ICD-10-CM

## 2024-04-16 PROCEDURE — 96121 NUBHVL XM PHY/QHP EA ADDL HR: CPT | Mod: 95

## 2024-04-16 PROCEDURE — 96116 NUBHVL XM PHYS/QHP 1ST HR: CPT | Mod: 95

## 2024-04-17 DIAGNOSIS — G31.84 MILD COGNITIVE IMPAIRMENT OF UNCERTAIN OR UNKNOWN ETIOLOGY: ICD-10-CM

## 2024-05-14 ENCOUNTER — APPOINTMENT (OUTPATIENT)
Dept: CARDIOLOGY | Facility: CLINIC | Age: 64
End: 2024-05-14
Payer: MEDICARE

## 2024-05-14 DIAGNOSIS — R73.03 PREDIABETES.: ICD-10-CM

## 2024-05-14 DIAGNOSIS — I10 ESSENTIAL (PRIMARY) HYPERTENSION: ICD-10-CM

## 2024-05-14 DIAGNOSIS — I25.10 ATHEROSCLEROTIC HEART DISEASE OF NATIVE CORONARY ARTERY W/OUT ANGINA PECTORIS: ICD-10-CM

## 2024-05-14 DIAGNOSIS — E78.5 HYPERLIPIDEMIA, UNSPECIFIED: ICD-10-CM

## 2024-05-14 PROCEDURE — 99214 OFFICE O/P EST MOD 30 MIN: CPT

## 2024-05-14 PROCEDURE — 93000 ELECTROCARDIOGRAM COMPLETE: CPT

## 2024-05-14 PROCEDURE — G2211 COMPLEX E/M VISIT ADD ON: CPT

## 2024-05-14 RX ORDER — TIZANIDINE HYDROCHLORIDE 6 MG/1
6 CAPSULE ORAL
Refills: 0 | Status: ACTIVE | COMMUNITY

## 2024-05-14 RX ORDER — EZETIMIBE 10 MG/1
10 TABLET ORAL
Qty: 90 | Refills: 3 | Status: ACTIVE | COMMUNITY
Start: 2021-10-13 | End: 1900-01-01

## 2024-05-14 RX ORDER — CYCLOBENZAPRINE HYDROCHLORIDE 10 MG/1
10 TABLET, FILM COATED ORAL
Refills: 0 | Status: ACTIVE | COMMUNITY

## 2024-05-14 RX ORDER — PANTOPRAZOLE 40 MG/1
40 TABLET, DELAYED RELEASE ORAL
Qty: 90 | Refills: 1 | Status: ACTIVE | COMMUNITY
Start: 2021-01-21 | End: 1900-01-01

## 2024-05-14 RX ORDER — OLMESARTAN MEDOXOMIL 20 MG/1
20 TABLET, FILM COATED ORAL DAILY
Qty: 90 | Refills: 3 | Status: ACTIVE | COMMUNITY
Start: 2024-05-14 | End: 1900-01-01

## 2024-05-14 RX ORDER — AMLODIPINE BESYLATE 10 MG/1
10 TABLET ORAL DAILY
Qty: 90 | Refills: 3 | Status: ACTIVE | COMMUNITY
Start: 2022-10-31 | End: 1900-01-01

## 2024-05-14 RX ORDER — ENALAPRIL MALEATE 20 MG/1
20 TABLET ORAL
Qty: 180 | Refills: 3 | Status: DISCONTINUED | COMMUNITY
Start: 2021-01-21 | End: 2024-05-14

## 2024-05-14 RX ORDER — UBIDECARENONE 30 MG
CAPSULE ORAL DAILY
Refills: 0 | Status: ACTIVE | COMMUNITY

## 2024-05-14 NOTE — HISTORY OF PRESENT ILLNESS
[FreeTextEntry1] : Mr. Jerez is a 64-year-old man with history of CAD (PCI of the RCA, mLAD 40% 12/2019), CPAP (diagnosed several years ago, not on CPAP), HTN, and HLD presenting for follow up.  Follows with Dr. Jackson and was last seen 11/2023. At that time felt well. Had some right sided muslce discomfort at night. Was exercising on a regular basis.  Labs: - Cr 0.85, K 4.4, normal transaminases - , TG 64, HDL 47, LDL 68, VLDL 13 - A1c 6.0%  Carotids 3/2023: <50% bilateral ICA stenoses TTE 11/1/2021 nl LVEF 59%, mild MR Exercise: 10K steps per day, 4x weekly stretching/exercise, treadmill 4 days a week for 30 minutes Never used tobacco Father: SCD 57, attributed to MI; smoker Mother: HTN, CVA   Meds: - ASA 81mg - Pravastatin 10mg - Ezetimibe 10mg - Enalapril 20mg BID - Amlodipine 10mg - Clonazepam - Pantoprazole

## 2024-05-14 NOTE — ASSESSMENT
[FreeTextEntry1] : Mr. Jerez is a 64-year-old man with history of CAD (PCI RCA, mLAD 40% 12/2019), HTN, and HLD presenting for follow up.  Impression: (1) Coronary artery disease, s/p elective PCI of the RCA in 2019 with residual 40% mLAD disease, LDL 82 on Zetia, did not tolerate rosuvastatin or atorvastatin 2/2 back pain, has non-cardiac chest discomfort with otherwise no anginal complaints. (2) HTN, on enalapril 20mg BID and amlodipine 5mg daily, suboptimal control based on home recordings (3) HLD  Plan: - Switch enalapril to olmesartan 20mg - Discussed, at length, indication for statins in history of ASCVD with PCI. At the end of our discussion he was amenable to trying pravastatin 10mg. Ideally LDL should be <70 given hx of PCI and <55 for plaque regression. - Continue amlodipine 10mg daily. - Home sleep study  RTC in 3-6 months to see Dr. Jackson.

## 2024-05-14 NOTE — CARDIOLOGY SUMMARY
[de-identified] : EKG 6/27/22 sinus bradycardia 54 bpm\par  EKG 3/30/22 NSR 70 bpm, no ischemia [de-identified] : TTE 11/1/21 nl LV sys function, LVEF 59%, mild MR [de-identified] : Stress echo 11/2022: 10.3 mets, normal exercise stress test, echo negative for ischemia

## 2024-06-04 ENCOUNTER — OUTPATIENT (OUTPATIENT)
Dept: OUTPATIENT SERVICES | Facility: HOSPITAL | Age: 64
LOS: 1 days | End: 2024-06-04
Payer: MEDICARE

## 2024-06-04 ENCOUNTER — APPOINTMENT (OUTPATIENT)
Dept: NEUROPSYCHOLOGY | Facility: CLINIC | Age: 64
End: 2024-06-04

## 2024-06-04 DIAGNOSIS — Z98.890 OTHER SPECIFIED POSTPROCEDURAL STATES: Chronic | ICD-10-CM

## 2024-06-04 DIAGNOSIS — G31.84 MILD COGNITIVE IMPAIRMENT OF UNCERTAIN OR UNKNOWN ETIOLOGY: ICD-10-CM

## 2024-06-04 PROCEDURE — 96133 NRPSYC TST EVAL PHYS/QHP EA: CPT

## 2024-06-04 PROCEDURE — 96138 PSYCL/NRPSYC TECH 1ST: CPT

## 2024-06-04 PROCEDURE — 96132 NRPSYC TST EVAL PHYS/QHP 1ST: CPT

## 2024-06-04 PROCEDURE — 96139 PSYCL/NRPSYC TST TECH EA: CPT

## 2024-06-05 DIAGNOSIS — G31.84 MILD COGNITIVE IMPAIRMENT OF UNCERTAIN OR UNKNOWN ETIOLOGY: ICD-10-CM

## 2024-06-11 ENCOUNTER — APPOINTMENT (OUTPATIENT)
Dept: NEUROPSYCHOLOGY | Facility: CLINIC | Age: 64
End: 2024-06-11

## 2024-06-11 ENCOUNTER — OUTPATIENT (OUTPATIENT)
Dept: OUTPATIENT SERVICES | Facility: HOSPITAL | Age: 64
LOS: 1 days | Discharge: HOME | End: 2024-06-11
Payer: MEDICARE

## 2024-06-11 ENCOUNTER — OUTPATIENT (OUTPATIENT)
Dept: OUTPATIENT SERVICES | Facility: HOSPITAL | Age: 64
LOS: 1 days | End: 2024-06-11

## 2024-06-11 ENCOUNTER — APPOINTMENT (OUTPATIENT)
Dept: SLEEP CENTER | Facility: HOSPITAL | Age: 64
End: 2024-06-11
Payer: MEDICARE

## 2024-06-11 DIAGNOSIS — Z98.890 OTHER SPECIFIED POSTPROCEDURAL STATES: Chronic | ICD-10-CM

## 2024-06-11 DIAGNOSIS — G31.84 MILD COGNITIVE IMPAIRMENT OF UNCERTAIN OR UNKNOWN ETIOLOGY: ICD-10-CM

## 2024-06-11 PROCEDURE — 95800 SLP STDY UNATTENDED: CPT | Mod: 26

## 2024-06-11 PROCEDURE — 95806 SLEEP STUDY UNATT&RESP EFFT: CPT

## 2024-06-12 DIAGNOSIS — G47.33 OBSTRUCTIVE SLEEP APNEA (ADULT) (PEDIATRIC): ICD-10-CM

## 2024-06-13 DIAGNOSIS — G47.33 OBSTRUCTIVE SLEEP APNEA (ADULT) (PEDIATRIC): ICD-10-CM

## 2024-06-25 RX ORDER — CLONAZEPAM 1 MG/1
1 TABLET ORAL DAILY
Qty: 90 | Refills: 0 | Status: ACTIVE | COMMUNITY
Start: 1900-01-01 | End: 1900-01-01

## 2024-07-10 ENCOUNTER — APPOINTMENT (OUTPATIENT)
Dept: NEUROLOGY | Facility: CLINIC | Age: 64
End: 2024-07-10
Payer: MEDICARE

## 2024-07-10 ENCOUNTER — APPOINTMENT (OUTPATIENT)
Dept: NEUROPSYCHOLOGY | Facility: CLINIC | Age: 64
End: 2024-07-10

## 2024-07-10 PROCEDURE — G2211 COMPLEX E/M VISIT ADD ON: CPT

## 2024-07-10 PROCEDURE — 99215 OFFICE O/P EST HI 40 MIN: CPT

## 2024-07-22 ENCOUNTER — TRANSCRIPTION ENCOUNTER (OUTPATIENT)
Age: 64
End: 2024-07-22

## 2024-07-24 ENCOUNTER — APPOINTMENT (OUTPATIENT)
Dept: NEUROPSYCHOLOGY | Facility: CLINIC | Age: 64
End: 2024-07-24

## 2024-07-24 ENCOUNTER — OUTPATIENT (OUTPATIENT)
Dept: OUTPATIENT SERVICES | Facility: HOSPITAL | Age: 64
LOS: 1 days | Discharge: HOME | End: 2024-07-24
Payer: MEDICARE

## 2024-07-24 DIAGNOSIS — F41.9 ANXIETY DISORDER, UNSPECIFIED: ICD-10-CM

## 2024-07-24 DIAGNOSIS — G31.84 MILD COGNITIVE IMPAIRMENT OF UNCERTAIN OR UNKNOWN ETIOLOGY: ICD-10-CM

## 2024-07-24 DIAGNOSIS — Z98.890 OTHER SPECIFIED POSTPROCEDURAL STATES: Chronic | ICD-10-CM

## 2024-07-24 PROCEDURE — 96132 NRPSYC TST EVAL PHYS/QHP 1ST: CPT | Mod: 95

## 2024-07-24 PROCEDURE — 96133 NRPSYC TST EVAL PHYS/QHP EA: CPT | Mod: 95

## 2024-07-24 NOTE — REASON FOR VISIT
[Access issues (e.g., transportation, impaired mobility, etc.)] : due to patient's access issues [Continuity of care] : to ensure continuity of care [Telehealth (audio & video) - Individual/Group] : This visit was provided via telehealth using real-time 2-way audio visual technology. [Other Location: e.g. Home (Enter Location, City,State)___] : The provider was located at [unfilled]. [Verbal consent obtained from patient/other participant(s)] : Verbal consent for telehealth/telephonic services obtained from patient/other participant(s) [FreeTextEntry4] : 10:05 [FreeTextEntry5] : 11:15 [Feedback of results of neuropsychological evaluation] : Feedback of results of neuropsychological evaluation

## 2024-07-24 NOTE — DISCUSSION/SUMMARY
[FreeTextEntry8] : Reason for Visit/Subjective Information:  SEKOU HASTINGS was seen for a feedback appointment to review the results of cognitive and psychological testing. SEKOU was referred to this service to determine his cognitive and psychological status.    Objective Information: SEKOU presented with a euthymic  mood and congruent  affect. He engaged appropriately   in the feedback session and demonstrated an understanding of the results.       Assessment/Intervention: The results of testing were reviewed, including diagnostic impressions and treatment recommendations.       Plan/Follow-up: A finalized copy of the neuropsychological report will be sent to SEKOU and the referral source. SEKOU will then be discharged from this service.

## 2024-07-25 DIAGNOSIS — G31.84 MILD COGNITIVE IMPAIRMENT OF UNCERTAIN OR UNKNOWN ETIOLOGY: ICD-10-CM

## 2024-07-25 DIAGNOSIS — F41.9 ANXIETY DISORDER, UNSPECIFIED: ICD-10-CM

## 2024-07-29 NOTE — DISCHARGE NOTE PROVIDER - NSDCADMDATE_GEN_ALL_CORE_FT
ANTICOAGULATION     Renetta Reid is overdue for an INR check.     Left message for patient to call and schedule lab appointment as soon as possible. If returning call, please schedule.     Maryann Gutierrez RN    
09-Dec-2019 13:33

## 2024-08-16 ENCOUNTER — TRANSCRIPTION ENCOUNTER (OUTPATIENT)
Age: 64
End: 2024-08-16

## 2024-11-14 ENCOUNTER — RX RENEWAL (OUTPATIENT)
Age: 64
End: 2024-11-14

## 2024-11-14 RX ORDER — PANTOPRAZOLE 40 MG/1
40 TABLET, DELAYED RELEASE ORAL
Qty: 90 | Refills: 1 | Status: ACTIVE | COMMUNITY
Start: 2024-11-14 | End: 1900-01-01

## 2024-11-26 ENCOUNTER — APPOINTMENT (OUTPATIENT)
Dept: CARDIOLOGY | Facility: CLINIC | Age: 64
End: 2024-11-26
Payer: MEDICARE

## 2024-11-26 VITALS — HEART RATE: 52 BPM | DIASTOLIC BLOOD PRESSURE: 80 MMHG | SYSTOLIC BLOOD PRESSURE: 122 MMHG | OXYGEN SATURATION: 98 %

## 2024-11-26 VITALS — BODY MASS INDEX: 28.12 KG/M2 | WEIGHT: 175 LBS | HEIGHT: 66 IN

## 2024-11-26 DIAGNOSIS — I25.10 ATHEROSCLEROTIC HEART DISEASE OF NATIVE CORONARY ARTERY W/OUT ANGINA PECTORIS: ICD-10-CM

## 2024-11-26 DIAGNOSIS — I10 ESSENTIAL (PRIMARY) HYPERTENSION: ICD-10-CM

## 2024-11-26 DIAGNOSIS — Z71.82 EXERCISE COUNSELING: ICD-10-CM

## 2024-11-26 DIAGNOSIS — I25.9 CHRONIC ISCHEMIC HEART DISEASE, UNSPECIFIED: ICD-10-CM

## 2024-11-26 DIAGNOSIS — Z71.3 DIETARY COUNSELING AND SURVEILLANCE: ICD-10-CM

## 2024-11-26 DIAGNOSIS — E78.5 HYPERLIPIDEMIA, UNSPECIFIED: ICD-10-CM

## 2024-11-26 DIAGNOSIS — Z71.89 OTHER SPECIFIED COUNSELING: ICD-10-CM

## 2024-11-26 DIAGNOSIS — R73.03 PREDIABETES.: ICD-10-CM

## 2024-11-26 PROCEDURE — 99214 OFFICE O/P EST MOD 30 MIN: CPT

## 2024-11-26 PROCEDURE — 93000 ELECTROCARDIOGRAM COMPLETE: CPT

## 2024-11-26 PROCEDURE — G2211 COMPLEX E/M VISIT ADD ON: CPT

## 2024-11-27 LAB
ANION GAP SERPL CALC-SCNC: 17 MMOL/L
BUN SERPL-MCNC: 23 MG/DL
CALCIUM SERPL-MCNC: 9.4 MG/DL
CHLORIDE SERPL-SCNC: 101 MMOL/L
CO2 SERPL-SCNC: 23 MMOL/L
CREAT SERPL-MCNC: 0.8 MG/DL
EGFR: 98 ML/MIN/1.73M2
GLUCOSE SERPL-MCNC: 45 MG/DL
POTASSIUM SERPL-SCNC: 4.5 MMOL/L
SODIUM SERPL-SCNC: 141 MMOL/L

## 2024-12-18 ENCOUNTER — APPOINTMENT (OUTPATIENT)
Dept: NEUROLOGY | Facility: CLINIC | Age: 64
End: 2024-12-18
Payer: MEDICARE

## 2024-12-18 PROCEDURE — G2211 COMPLEX E/M VISIT ADD ON: CPT

## 2024-12-18 PROCEDURE — 99215 OFFICE O/P EST HI 40 MIN: CPT

## 2025-05-19 ENCOUNTER — NON-APPOINTMENT (OUTPATIENT)
Age: 65
End: 2025-05-19

## 2025-05-20 ENCOUNTER — APPOINTMENT (OUTPATIENT)
Dept: CARDIOLOGY | Facility: CLINIC | Age: 65
End: 2025-05-20
Payer: MEDICARE

## 2025-05-20 VITALS
SYSTOLIC BLOOD PRESSURE: 128 MMHG | DIASTOLIC BLOOD PRESSURE: 70 MMHG | HEIGHT: 66 IN | HEART RATE: 51 BPM | BODY MASS INDEX: 28.45 KG/M2 | WEIGHT: 177 LBS

## 2025-05-20 DIAGNOSIS — Z71.82 EXERCISE COUNSELING: ICD-10-CM

## 2025-05-20 DIAGNOSIS — E78.5 HYPERLIPIDEMIA, UNSPECIFIED: ICD-10-CM

## 2025-05-20 DIAGNOSIS — I25.10 ATHEROSCLEROTIC HEART DISEASE OF NATIVE CORONARY ARTERY W/OUT ANGINA PECTORIS: ICD-10-CM

## 2025-05-20 DIAGNOSIS — Z71.3 DIETARY COUNSELING AND SURVEILLANCE: ICD-10-CM

## 2025-05-20 DIAGNOSIS — I10 ESSENTIAL (PRIMARY) HYPERTENSION: ICD-10-CM

## 2025-05-20 DIAGNOSIS — Z71.89 OTHER SPECIFIED COUNSELING: ICD-10-CM

## 2025-05-20 PROCEDURE — 93000 ELECTROCARDIOGRAM COMPLETE: CPT

## 2025-05-20 PROCEDURE — G2211 COMPLEX E/M VISIT ADD ON: CPT

## 2025-05-20 PROCEDURE — 99214 OFFICE O/P EST MOD 30 MIN: CPT

## 2025-05-20 RX ORDER — SILODOSIN 8 MG/1
8 CAPSULE ORAL
Refills: 0 | Status: ACTIVE | COMMUNITY

## 2025-06-27 ENCOUNTER — APPOINTMENT (OUTPATIENT)
Facility: CLINIC | Age: 65
End: 2025-06-27
Payer: MEDICARE

## 2025-06-27 VITALS — SYSTOLIC BLOOD PRESSURE: 125 MMHG | OXYGEN SATURATION: 96 % | HEART RATE: 55 BPM | DIASTOLIC BLOOD PRESSURE: 69 MMHG

## 2025-06-27 VITALS
TEMPERATURE: 98.3 F | DIASTOLIC BLOOD PRESSURE: 70 MMHG | BODY MASS INDEX: 28.65 KG/M2 | HEART RATE: 56 BPM | SYSTOLIC BLOOD PRESSURE: 128 MMHG | WEIGHT: 177.5 LBS | OXYGEN SATURATION: 95 %

## 2025-06-27 PROCEDURE — G2211 COMPLEX E/M VISIT ADD ON: CPT

## 2025-06-27 PROCEDURE — 99215 OFFICE O/P EST HI 40 MIN: CPT

## 2025-07-23 ENCOUNTER — RX RENEWAL (OUTPATIENT)
Age: 65
End: 2025-07-23